# Patient Record
Sex: FEMALE | Race: WHITE | Employment: FULL TIME | URBAN - METROPOLITAN AREA
[De-identification: names, ages, dates, MRNs, and addresses within clinical notes are randomized per-mention and may not be internally consistent; named-entity substitution may affect disease eponyms.]

---

## 2020-03-13 ENCOUNTER — OFFICE VISIT (OUTPATIENT)
Dept: FAMILY MEDICINE CLINIC | Facility: CLINIC | Age: 45
End: 2020-03-13
Payer: COMMERCIAL

## 2020-03-13 ENCOUNTER — LAB ENCOUNTER (OUTPATIENT)
Dept: LAB | Facility: REFERENCE LAB | Age: 45
End: 2020-03-13
Attending: FAMILY MEDICINE
Payer: COMMERCIAL

## 2020-03-13 VITALS
WEIGHT: 160 LBS | OXYGEN SATURATION: 98 % | DIASTOLIC BLOOD PRESSURE: 76 MMHG | HEIGHT: 67 IN | TEMPERATURE: 97 F | HEART RATE: 74 BPM | SYSTOLIC BLOOD PRESSURE: 108 MMHG | BODY MASS INDEX: 25.11 KG/M2

## 2020-03-13 DIAGNOSIS — Z23 NEED FOR VACCINATION: ICD-10-CM

## 2020-03-13 DIAGNOSIS — Z00.00 ENCOUNTER FOR ROUTINE ADULT HEALTH EXAMINATION WITHOUT ABNORMAL FINDINGS: Primary | ICD-10-CM

## 2020-03-13 DIAGNOSIS — Z00.00 ENCOUNTER FOR ROUTINE ADULT HEALTH EXAMINATION WITHOUT ABNORMAL FINDINGS: ICD-10-CM

## 2020-03-13 DIAGNOSIS — R63.5 WEIGHT GAIN: ICD-10-CM

## 2020-03-13 DIAGNOSIS — R25.3 EYELID TWITCH: ICD-10-CM

## 2020-03-13 DIAGNOSIS — F41.9 ANXIETY: ICD-10-CM

## 2020-03-13 DIAGNOSIS — Z12.31 SCREENING MAMMOGRAM, ENCOUNTER FOR: ICD-10-CM

## 2020-03-13 LAB
ALBUMIN SERPL-MCNC: 4.4 G/DL (ref 3.4–5)
ALBUMIN/GLOB SERPL: 1.2 {RATIO} (ref 1–2)
ALP LIVER SERPL-CCNC: 59 U/L (ref 39–100)
ALT SERPL-CCNC: 20 U/L (ref 13–56)
ANION GAP SERPL CALC-SCNC: 7 MMOL/L (ref 0–18)
AST SERPL-CCNC: 14 U/L (ref 15–37)
BASOPHILS # BLD AUTO: 0.05 X10(3) UL (ref 0–0.2)
BASOPHILS NFR BLD AUTO: 0.5 %
BILIRUB SERPL-MCNC: 0.8 MG/DL (ref 0.1–2)
BUN BLD-MCNC: 16 MG/DL (ref 7–18)
BUN/CREAT SERPL: 22.2 (ref 10–20)
CALCIUM BLD-MCNC: 9.1 MG/DL (ref 8.5–10.1)
CHLORIDE SERPL-SCNC: 106 MMOL/L (ref 98–112)
CHOLEST SMN-MCNC: 195 MG/DL (ref ?–200)
CO2 SERPL-SCNC: 27 MMOL/L (ref 21–32)
CREAT BLD-MCNC: 0.72 MG/DL (ref 0.55–1.02)
DEPRECATED RDW RBC AUTO: 43.2 FL (ref 35.1–46.3)
EOSINOPHIL # BLD AUTO: 0.1 X10(3) UL (ref 0–0.7)
EOSINOPHIL NFR BLD AUTO: 1.1 %
ERYTHROCYTE [DISTWIDTH] IN BLOOD BY AUTOMATED COUNT: 12 % (ref 11–15)
EST. AVERAGE GLUCOSE BLD GHB EST-MCNC: 103 MG/DL (ref 68–126)
GLOBULIN PLAS-MCNC: 3.7 G/DL (ref 2.8–4.4)
GLUCOSE BLD-MCNC: 79 MG/DL (ref 70–99)
HBA1C MFR BLD HPLC: 5.2 % (ref ?–5.7)
HCT VFR BLD AUTO: 40.6 % (ref 35–48)
HCV AB SERPL QL IA: NONREACTIVE
HDLC SERPL-MCNC: 91 MG/DL (ref 40–59)
HGB BLD-MCNC: 13.7 G/DL (ref 12–16)
IMM GRANULOCYTES # BLD AUTO: 0.01 X10(3) UL (ref 0–1)
IMM GRANULOCYTES NFR BLD: 0.1 %
LDLC SERPL CALC-MCNC: 93 MG/DL (ref ?–100)
LYMPHOCYTES # BLD AUTO: 2.18 X10(3) UL (ref 1–4)
LYMPHOCYTES NFR BLD AUTO: 23.3 %
M PROTEIN MFR SERPL ELPH: 8.1 G/DL (ref 6.4–8.2)
MCH RBC QN AUTO: 33.4 PG (ref 26–34)
MCHC RBC AUTO-ENTMCNC: 33.7 G/DL (ref 31–37)
MCV RBC AUTO: 99 FL (ref 80–100)
MONOCYTES # BLD AUTO: 0.53 X10(3) UL (ref 0.1–1)
MONOCYTES NFR BLD AUTO: 5.7 %
NEUTROPHILS # BLD AUTO: 6.48 X10 (3) UL (ref 1.5–7.7)
NEUTROPHILS # BLD AUTO: 6.48 X10(3) UL (ref 1.5–7.7)
NEUTROPHILS NFR BLD AUTO: 69.3 %
NONHDLC SERPL-MCNC: 104 MG/DL (ref ?–130)
OSMOLALITY SERPL CALC.SUM OF ELEC: 290 MOSM/KG (ref 275–295)
PATIENT FASTING Y/N/NP: YES
PATIENT FASTING Y/N/NP: YES
PLATELET # BLD AUTO: 255 10(3)UL (ref 150–450)
POTASSIUM SERPL-SCNC: 3.5 MMOL/L (ref 3.5–5.1)
RBC # BLD AUTO: 4.1 X10(6)UL (ref 3.8–5.3)
SODIUM SERPL-SCNC: 140 MMOL/L (ref 136–145)
TRIGL SERPL-MCNC: 54 MG/DL (ref 30–149)
TSI SER-ACNC: 1.83 MIU/ML (ref 0.36–3.74)
VLDLC SERPL CALC-MCNC: 11 MG/DL (ref 0–30)
WBC # BLD AUTO: 9.4 X10(3) UL (ref 4–11)

## 2020-03-13 PROCEDURE — 80061 LIPID PANEL: CPT

## 2020-03-13 PROCEDURE — 99202 OFFICE O/P NEW SF 15 MIN: CPT | Performed by: FAMILY MEDICINE

## 2020-03-13 PROCEDURE — 85025 COMPLETE CBC W/AUTO DIFF WBC: CPT

## 2020-03-13 PROCEDURE — 36415 COLL VENOUS BLD VENIPUNCTURE: CPT

## 2020-03-13 PROCEDURE — 86803 HEPATITIS C AB TEST: CPT

## 2020-03-13 PROCEDURE — 90471 IMMUNIZATION ADMIN: CPT | Performed by: FAMILY MEDICINE

## 2020-03-13 PROCEDURE — 80053 COMPREHEN METABOLIC PANEL: CPT

## 2020-03-13 PROCEDURE — 84443 ASSAY THYROID STIM HORMONE: CPT

## 2020-03-13 PROCEDURE — 99386 PREV VISIT NEW AGE 40-64: CPT | Performed by: FAMILY MEDICINE

## 2020-03-13 PROCEDURE — 90686 IIV4 VACC NO PRSV 0.5 ML IM: CPT | Performed by: FAMILY MEDICINE

## 2020-03-13 PROCEDURE — 83036 HEMOGLOBIN GLYCOSYLATED A1C: CPT

## 2020-03-13 RX ORDER — VENLAFAXINE HYDROCHLORIDE 150 MG/1
CAPSULE, EXTENDED RELEASE ORAL
Qty: 60 CAPSULE | Refills: 0 | Status: SHIPPED | OUTPATIENT
Start: 2020-03-13 | End: 2020-04-13

## 2020-03-13 RX ORDER — VENLAFAXINE HYDROCHLORIDE 75 MG/1
CAPSULE, EXTENDED RELEASE ORAL
Qty: 10 CAPSULE | Refills: 0 | Status: SHIPPED | OUTPATIENT
Start: 2020-03-13 | End: 2020-04-13

## 2020-03-13 NOTE — PROGRESS NOTES
HPI:   Aure Singh is a 53year old female who presents for a complete physical exam.     Gynecologist is Dr. Tung Amanda.  Having her left earlobe repaired and will see Dr. Tiki Austin for this, but will postpone this until things settle down a little bit wit mild anxiety          Current Outpatient Medications   Medication Sig Dispense Refill   • Levonorgestrel (MIRENA, 52 MG,) 20 MCG/24HR Intrauterine IUD 1 each by Intrauterine route once.  Placed about 4 years     • Venlafaxine HCl ER 75 MG Oral Capsule SR 24 lower extremities, normal cerebellar testing, no focal neurologic deficits   Psych: normal mood and affect     No results found for: CHOLEST, HDL, LDL, TRIGLY   ASSESSMENT AND PLAN:   Aure Singh is a 53year old female who presents for a complete phys vaccine  -Need for Tdap once as an adult and Td booster every 10 years  -Need for Zoster vaccine for patients >= 50  -Contraception: Mirena IUD  -STI screening (GC/Chlamydia/HIV): Not indicated  -Hepatitis C screening for all adults between the ages of 25

## 2020-03-13 NOTE — PATIENT INSTRUCTIONS
Prevention Guidelines, Women Ages 36 to 52  Screening tests and vaccines are an important part of managing your health. A screening test is done to find diseases in people who don't have any symptoms.  The goal is to find a disease early so lifestyle hillman · Flexible sigmoidoscopy every 5 years, or  · Colonoscopy every 10 years, or  · CT colonography (virtual colonoscopy) every 5 years, or  · Yearly fecal occult blood test, or  · Yearly fecal immunochemical test every year, or  · Stool DNA test, every 3 year Chickenpox (varicella) All women in this age group who have no record of this infection or vaccine 2 doses; the second dose should be given at least 4 weeks after the first dose   Hepatitis A Women at increased risk for infection–talk with your healthcare Use of tobacco and the health effects it can cause All women in this age group Every exam   1 American Diabetes Association  2 American College of Obstetricians and Gynecologists   3 [address]  [address] [facility]  Date Last R · Anti-anxiety medicine. This medicine eases symptoms and helps you relax. Your healthcare provider will explain when and how to use it. It may be prescribed for use before situations that make you anxious.  You may also be told to take medicine on a regula · Side effects. Medicines may cause side effects. Ask your healthcare provider or pharmacist what you can expect. They may have ideas for avoiding some side effects. · Sexual problems.  Some antidepressants can affect your desire for sex or your ability to

## 2020-04-13 RX ORDER — VENLAFAXINE HYDROCHLORIDE 75 MG/1
75 CAPSULE, EXTENDED RELEASE ORAL DAILY
Qty: 14 CAPSULE | Refills: 0 | Status: SHIPPED | OUTPATIENT
Start: 2020-04-13 | End: 2020-04-15

## 2020-04-15 NOTE — PROGRESS NOTES
CC:  Patient presents with:  Medication Follow-Up: Adverse side effects from Venlafaxine       HPI: 53year old female presenting for video visit for adverse side effects from Venlafaxine.    Renville fine initially on Venlafaxine 75 mg daily and felt the eye t Lifestyle      Physical activity:        Days per week: Not on file        Minutes per session: Not on file      Stress: Not on file    Relationships      Social connections:        Talks on phone: Not on file        Gets together: Not on file        Atten and notify me if side effects do not resolve     Discussed that nodule at the base of her finger is likely due to a benign ganglion cyst but to notify me if it enlarges or causes any pain or limitations of motion.      Please note that the following visit w

## 2020-05-11 RX ORDER — VENLAFAXINE HYDROCHLORIDE 150 MG/1
CAPSULE, EXTENDED RELEASE ORAL
Qty: 30 CAPSULE | Refills: 1 | OUTPATIENT
Start: 2020-05-11

## 2020-05-11 NOTE — TELEPHONE ENCOUNTER
Please ensure patient is still taking 75 mg daily instead of 150 mg as discussed at her phone visit and doing well at 75 mg dose. Should have enough.

## 2020-05-11 NOTE — TELEPHONE ENCOUNTER
A refill request was received for:  Requested Prescriptions     Pending Prescriptions Disp Refills   • Venlafaxine HCl  MG Oral Capsule SR 24 Hr [Pharmacy Med Name: VENLAFAXINE HCL  MG CAP] 30 capsule 1     Sig: TAKE 1 CAPSULE BY MOUTH EVERY DA

## 2020-05-19 RX ORDER — VENLAFAXINE HYDROCHLORIDE 75 MG/1
75 CAPSULE, EXTENDED RELEASE ORAL DAILY
Qty: 90 CAPSULE | Refills: 1 | Status: SHIPPED | OUTPATIENT
Start: 2020-05-19 | End: 2020-10-05 | Stop reason: DRUGHIGH

## 2020-05-19 NOTE — TELEPHONE ENCOUNTER
Patient did clarify she is taking 75mg and is doing ok, but did only received a 30 day supply so does need a refill at the [facility] listed

## 2020-10-05 ENCOUNTER — TELEPHONE (OUTPATIENT)
Dept: FAMILY MEDICINE CLINIC | Facility: CLINIC | Age: 45
End: 2020-10-05

## 2020-10-05 RX ORDER — VENLAFAXINE HYDROCHLORIDE 37.5 MG/1
37.5 CAPSULE, EXTENDED RELEASE ORAL DAILY
Qty: 90 CAPSULE | Refills: 0 | Status: SHIPPED | OUTPATIENT
Start: 2020-10-05 | End: 2021-01-21

## 2020-10-05 NOTE — TELEPHONE ENCOUNTER
Reports she gained weight on Venlafaxine and felt dizzy while taking 150 mg, so she weaned down to 75 mg daily.  Wants to get off of it fully so she started taking it every other day but a week into this she felt extremely dizzy and had nausea with pain kathe

## 2020-10-20 ENCOUNTER — HOSPITAL ENCOUNTER (OUTPATIENT)
Dept: MAMMOGRAPHY | Age: 45
Discharge: HOME | End: 2020-10-20
Attending: FAMILY MEDICINE
Payer: COMMERCIAL

## 2020-10-20 DIAGNOSIS — Z12.31 SCREENING MAMMOGRAM, ENCOUNTER FOR: ICD-10-CM

## 2020-10-20 PROCEDURE — 77067 SCR MAMMO BI INCL CAD: CPT | Performed by: FAMILY MEDICINE

## 2020-10-20 PROCEDURE — 77063 BREAST TOMOSYNTHESIS BI: CPT | Performed by: FAMILY MEDICINE

## 2020-11-06 RX ORDER — VENLAFAXINE HYDROCHLORIDE 75 MG/1
CAPSULE, EXTENDED RELEASE ORAL
Qty: 30 CAPSULE | Refills: 5 | OUTPATIENT
Start: 2020-11-06

## 2020-11-10 ENCOUNTER — TELEPHONE (OUTPATIENT)
Dept: FAMILY MEDICINE CLINIC | Facility: CLINIC | Age: 45
End: 2020-11-10

## 2020-11-10 NOTE — TELEPHONE ENCOUNTER
Called pharmacy and got one month supply d/t insurance and pt has 2 refills at the pharmacy. Called pt and informed that have refills at pharmacy and explained that insurance will only give a month supply.   Per pt has tried to pick-up medication but pha

## 2020-11-10 NOTE — TELEPHONE ENCOUNTER
Patient is needing refill       Venlafaxine HCl ER 37.5 MG Oral Capsule SR 24 Hr      [id number] [place], [place] - [address]  AT [place], [phone], [phone]

## 2021-01-04 RX ORDER — VENLAFAXINE HYDROCHLORIDE 37.5 MG/1
CAPSULE, EXTENDED RELEASE ORAL
Qty: 30 CAPSULE | Refills: 1 | OUTPATIENT
Start: 2021-01-04

## 2021-01-21 RX ORDER — VENLAFAXINE HYDROCHLORIDE 37.5 MG/1
37.5 CAPSULE, EXTENDED RELEASE ORAL DAILY
Qty: 30 CAPSULE | Refills: 0 | Status: SHIPPED | OUTPATIENT
Start: 2021-01-21 | End: 2021-02-15

## 2021-02-15 RX ORDER — VENLAFAXINE HYDROCHLORIDE 37.5 MG/1
CAPSULE, EXTENDED RELEASE ORAL
Qty: 30 CAPSULE | Refills: 0 | Status: SHIPPED | OUTPATIENT
Start: 2021-02-15 | End: 2021-02-18 | Stop reason: ALTCHOICE

## 2021-02-15 NOTE — TELEPHONE ENCOUNTER
A refill request was received for:  Requested Prescriptions     Pending Prescriptions Disp Refills   • VENLAFAXINE HCL ER 37.5 MG Oral Capsule SR 24 Hr [Pharmacy Med Name: VENLAFAXINE HCL ER 37.5 MG CAP] 30 capsule 0     Sig: TAKE 1 CAPSULE BY MOUTH EVERY

## 2021-02-18 ENCOUNTER — LABORATORY ENCOUNTER (OUTPATIENT)
Dept: LAB | Facility: REFERENCE LAB | Age: 46
End: 2021-02-18
Attending: FAMILY MEDICINE
Payer: COMMERCIAL

## 2021-02-18 DIAGNOSIS — M25.50 ARTHRALGIA, UNSPECIFIED JOINT: ICD-10-CM

## 2021-02-18 LAB — RHEUMATOID FACT SERPL-ACNC: <10 IU/ML (ref ?–15)

## 2021-02-18 PROCEDURE — 36415 COLL VENOUS BLD VENIPUNCTURE: CPT

## 2021-02-18 PROCEDURE — 86200 CCP ANTIBODY: CPT

## 2021-02-18 PROCEDURE — 86431 RHEUMATOID FACTOR QUANT: CPT

## 2021-02-18 NOTE — PATIENT INSTRUCTIONS
-Taper off Venlafaxine prior to starting Desvenlafaxine by taking Venlafaxine 37.5 mg every other day for one week, then do not take anything for one day, then start new Desvenlafaxine at 25 mg daily  -Notify me how you are doing after a few weeks or soone and bilirubin. Both of these may be high if the gallstones are blocking bile flow or irritating the liver. Treating gallstones  If your stones are not causing symptoms, you may choose to delay treatment.  But if you’ve had 1 or more painful attacks, your

## 2021-02-18 NOTE — PROGRESS NOTES
CC:  Patient presents with: Follow - Up: patient has a colonoscopy and has gallstones  Other: talk about her medication      HPI: 50year old female here to follow-up on medication for anxiety and following up after colonoscopy.   Had a positive Cologuard Non-medical: Not on file    Tobacco Use      Smoking status: Never Smoker      Smokeless tobacco: Never Used    Substance and Sexual Activity      Alcohol use: Yes        Comment: socailly      Drug use: Never      Sexual activity: Not on file    Lifestyle masses, no guarding, no rebound  Dermatologic:  No rashes or lesions  Psych: normal mood and affect    Assessment and Plan: 50year old female here to follow-up on anxiety management, gallstones, constipation, and joint pain.      1. Anxiety    - Did feel V

## 2021-02-19 LAB — CCP IGG SERPL-ACNC: 0.7 U/ML (ref 0–6.9)

## 2021-03-08 ENCOUNTER — MED REC SCAN ONLY (OUTPATIENT)
Dept: FAMILY MEDICINE CLINIC | Facility: CLINIC | Age: 46
End: 2021-03-08

## 2021-03-11 ENCOUNTER — PATIENT MESSAGE (OUTPATIENT)
Dept: FAMILY MEDICINE CLINIC | Facility: CLINIC | Age: 46
End: 2021-03-11

## 2021-03-12 NOTE — TELEPHONE ENCOUNTER
----- Message from Alexandro Roman DO sent at 3/12/2021  9:55 AM CST -----  Regarding: FW: Non-Urgent Medical Question  Contact: [phone]  Mind sending her the information, calling her, or having her come back up the results?  Thanks!  ----- Message -----

## 2021-03-24 ENCOUNTER — HOSPITAL ENCOUNTER (OUTPATIENT)
Dept: GENERAL RADIOLOGY | Age: 46
Discharge: HOME | End: 2021-03-24
Attending: PHYSICAL MEDICINE & REHABILITATION
Payer: COMMERCIAL

## 2021-03-24 ENCOUNTER — TELEPHONE (OUTPATIENT)
Dept: NEUROLOGY | Facility: CLINIC | Age: 46
End: 2021-03-24

## 2021-03-24 ENCOUNTER — OFFICE VISIT (OUTPATIENT)
Dept: NEUROLOGY | Facility: CLINIC | Age: 46
End: 2021-03-24
Payer: COMMERCIAL

## 2021-03-24 VITALS
OXYGEN SATURATION: 97 % | RESPIRATION RATE: 20 BRPM | SYSTOLIC BLOOD PRESSURE: 110 MMHG | WEIGHT: 142 LBS | HEART RATE: 75 BPM | BODY MASS INDEX: 22.29 KG/M2 | HEIGHT: 67 IN | DIASTOLIC BLOOD PRESSURE: 70 MMHG

## 2021-03-24 DIAGNOSIS — M79.644 CHRONIC PAIN OF RIGHT THUMB: ICD-10-CM

## 2021-03-24 DIAGNOSIS — M77.11 RIGHT LATERAL EPICONDYLITIS: ICD-10-CM

## 2021-03-24 DIAGNOSIS — M54.12 CERVICAL RADICULOPATHY: ICD-10-CM

## 2021-03-24 DIAGNOSIS — G89.29 CHRONIC PAIN OF RIGHT THUMB: ICD-10-CM

## 2021-03-24 DIAGNOSIS — M54.12 CERVICAL RADICULOPATHY: Primary | ICD-10-CM

## 2021-03-24 PROCEDURE — 99244 OFF/OP CNSLTJ NEW/EST MOD 40: CPT | Performed by: PHYSICAL MEDICINE & REHABILITATION

## 2021-03-24 PROCEDURE — 72050 X-RAY EXAM NECK SPINE 4/5VWS: CPT | Performed by: PHYSICAL MEDICINE & REHABILITATION

## 2021-03-24 PROCEDURE — 73130 X-RAY EXAM OF HAND: CPT | Performed by: PHYSICAL MEDICINE & REHABILITATION

## 2021-03-24 PROCEDURE — 3008F BODY MASS INDEX DOCD: CPT | Performed by: PHYSICAL MEDICINE & REHABILITATION

## 2021-03-24 PROCEDURE — 3074F SYST BP LT 130 MM HG: CPT | Performed by: PHYSICAL MEDICINE & REHABILITATION

## 2021-03-24 PROCEDURE — 3078F DIAST BP <80 MM HG: CPT | Performed by: PHYSICAL MEDICINE & REHABILITATION

## 2021-03-24 NOTE — PATIENT INSTRUCTIONS
Plan  She will get right thumb x-rays. She will get cervical spine x-rays. She will start PT on the cervical spine and the right arm. She will look into using short thumb spica splint.     I spoke to her about the different types of injections fo

## 2021-03-24 NOTE — PROGRESS NOTES
Cervical Pain H & P    Chief Complaint:  Patient presents with:  Hand Pain: New right handed patient. Rfd. by Dr. Humberto Rivas. Patientn here for right elbow and right thumb pain that started about a year ago from tennis and fall.  Patient states she had steroid sh sharp pain. The thumb pain ranges from 0-7/10 and it is a sharp pain that is brief only when she hits the right thumb. She is not able to grab or twist off caps due to the right thumb issues. She denies any neck or shoulder pain.       Past Medical Histo of Food in the Last Year:       Ran Out of Food in the Last Year:   Transportation Needs:       Lack of Transportation (Medical):       Lack of Transportation (Non-Medical):   Physical Activity:       Days of Exercise per Week:       Minutes of Exercise pe rashes or lesions. Lymph Nodes: The patient has no palpable submandibular, supraclavicular, and cervical lymph nodes. .    Vitals:   03/24/21  0908   BP: 110/70   Pulse: 75   Resp: 20       Cervical Spine:    Posture: moderate chin forward superiorly ro of injections for the lateral epicondylitis if the PT does not help. She will follow up in 2 month or sooner if needed. The patient understands and agrees with the stated plan. Mae Lozano MD  3/24/2021

## 2021-04-05 ENCOUNTER — HOSPITAL ENCOUNTER (OUTPATIENT)
Age: 46
Discharge: HOME | End: 2021-04-05
Payer: COMMERCIAL

## 2021-04-05 VITALS
RESPIRATION RATE: 18 BRPM | TEMPERATURE: 98 F | OXYGEN SATURATION: 100 % | SYSTOLIC BLOOD PRESSURE: 121 MMHG | DIASTOLIC BLOOD PRESSURE: 80 MMHG | HEART RATE: 64 BPM

## 2021-04-05 DIAGNOSIS — Z20.822 ENCOUNTER FOR LABORATORY TESTING FOR COVID-19 VIRUS: Primary | ICD-10-CM

## 2021-04-05 PROCEDURE — 99202 OFFICE O/P NEW SF 15 MIN: CPT | Performed by: NURSE PRACTITIONER

## 2021-04-05 PROCEDURE — U0002 COVID-19 LAB TEST NON-CDC: HCPCS | Performed by: NURSE PRACTITIONER

## 2021-04-05 NOTE — ED PROVIDER NOTES
Patient Seen in: [facility]    History   CC: covid testing  HPI: Ricci Henriquez 50year old female  who presents requesting Covid testing after returning home from [place] 2 days ago. Patient denies any symptoms.     Past Medical History:   D bones  Eyes - sclera not injected, no discharge noted, no periorbital edema  ENT - EAC bilaterally without discharge, TM pearly grey with COL visualized appropriately bilaterally. no nasal drainage noted in nares bilat, no cobblestoning to post. Pharynx.

## 2021-04-13 ENCOUNTER — LAB REQUISITION (OUTPATIENT)
Dept: LAB | Facility: HOSPITAL | Age: 46
End: 2021-04-13
Payer: COMMERCIAL

## 2021-04-13 DIAGNOSIS — Z01.818 ENCOUNTER FOR OTHER PREPROCEDURAL EXAMINATION: ICD-10-CM

## 2021-04-16 ENCOUNTER — SURGERY CENTER DOCUMENTATION (OUTPATIENT)
Dept: SURGERY | Age: 46
End: 2021-04-16

## 2021-04-16 NOTE — PROCEDURES
[facility] OPERATIVE REPORT:     PATIENT NAME: Nancy Talbot  : 11/15/1975   MRN: [id number]    DATE OF OPERATION:   21    PREOPERATIVE DIAGNOSIS: Chronic Cholecystitis, Cholelithiasis      POSTOPERATIVE DIAGNOSIS: Chron times below the ductotomy and transected. The cystic artery was clipped and transected and the gallbladder was removed from the liver bed using blunt dissection and electrocautery.   The gallbladder was placed in specimen retrieval bag and was removed thro

## 2021-05-13 RX ORDER — DESVENLAFAXINE 25 MG/1
TABLET, EXTENDED RELEASE ORAL
Qty: 30 TABLET | Refills: 2 | Status: SHIPPED | OUTPATIENT
Start: 2021-05-13 | End: 2021-08-28

## 2021-05-13 NOTE — TELEPHONE ENCOUNTER
A refill request was received for:  Requested Prescriptions     Pending Prescriptions Disp Refills   • DESVENLAFAXINE SUCCINATE ER 25 MG Oral Tablet 24 Hr [Pharmacy Med Name: DESVENLAFAXINE SUCCNT ER 25 MG] 30 tablet 2     Sig: TAKE 1 TABLET BY MOUTH DAILY

## 2021-05-25 ENCOUNTER — TELEPHONE (OUTPATIENT)
Dept: NEUROLOGY | Facility: CLINIC | Age: 46
End: 2021-05-25

## 2021-05-25 NOTE — TELEPHONE ENCOUNTER
----- Message from Glory Graham MD sent at 5/21/2021 10:33 PM CDT -----  Normal x-rays. She had surgery on her gall bladder.   Please see how she is doing now since it has been over 2 months since I last saw her and if she was able to do any of the PT.

## 2021-06-26 ENCOUNTER — PATIENT MESSAGE (OUTPATIENT)
Dept: FAMILY MEDICINE CLINIC | Facility: CLINIC | Age: 46
End: 2021-06-26

## 2021-06-28 NOTE — TELEPHONE ENCOUNTER
From: Elis Birch  To: Brennon Turcios DO  Sent: 6/26/2021 10:25 AM CDT  Subject: Prescription Question    Dr. Jennifer Fan,    I would like to change my medication. Can I get on a quick call with you?

## 2021-06-28 NOTE — TELEPHONE ENCOUNTER
DO Inocencia Pereira 10 Dr. Jasmin Ash 1 hour ago (11:36 AM)     Please schedule video visit during any pumping slot. Called pt and scheduled for video visit on 06/30/21 at 1030 am.  Pt verbalized understanding and agrees with POC.

## 2021-06-30 NOTE — PROGRESS NOTES
CC:  Patient presents with:  Medication Follow-Up      HPI: 50year old female presenting for video visit to follow-up on medication for anxiety.   Switched to Pristiq from Venlafaxine in February due to sexual side effects with Venlafaxine, but she is stil Concern: Not Asked         Service: Not Asked        Blood Transfusions: Not Asked        Occupational Exposure: Not Asked        Hobby Hazards: Not Asked        Sleep Concern: Not Asked        Back Care: Not Asked        Bike Helmet: Not Asked problems made it for you to  do your work, take care of things at home, or get along with other people? Not difficult at all     From the Primary Care Evaluation of Mental Disorders Patient Health Questionnaire (PRIME-MD PHQ).  The PHQ was developed by [employer] present    - Related to recent cholecystectomy  - Given handout on dietary changes needed and also to increase Famotidine to 20 mg twice daily x 1-2 months     3.  Night sweats    - TSH normal when recently checked  - Likely perimenopausal and recommend Mac

## 2021-07-07 ENCOUNTER — MED REC SCAN ONLY (OUTPATIENT)
Dept: NEUROLOGY | Facility: CLINIC | Age: 46
End: 2021-07-07

## 2021-08-09 ENCOUNTER — HOSPITAL ENCOUNTER (OUTPATIENT)
Age: 46
Discharge: HOME | End: 2021-08-09
Payer: COMMERCIAL

## 2021-08-09 VITALS
DIASTOLIC BLOOD PRESSURE: 88 MMHG | TEMPERATURE: 97 F | HEART RATE: 70 BPM | RESPIRATION RATE: 18 BRPM | SYSTOLIC BLOOD PRESSURE: 131 MMHG | OXYGEN SATURATION: 98 %

## 2021-08-09 DIAGNOSIS — J02.9 VIRAL PHARYNGITIS: Primary | ICD-10-CM

## 2021-08-09 LAB — S PYO AG THROAT QL: NEGATIVE

## 2021-08-09 PROCEDURE — 87880 STREP A ASSAY W/OPTIC: CPT | Performed by: NURSE PRACTITIONER

## 2021-08-09 PROCEDURE — 99213 OFFICE O/P EST LOW 20 MIN: CPT | Performed by: NURSE PRACTITIONER

## 2021-08-09 NOTE — ED INITIAL ASSESSMENT (HPI)
Pt here with c/o sore throat after positive exposure to strep in family. Sore throat started slightly on Thursday and then worsened last night. Denies any other complaints.

## 2021-08-09 NOTE — ED PROVIDER NOTES
Patient Seen in: [facility]      History   Patient presents with:  Sore Throat: Entered by patient    Stated Complaint: Sore Throat    HPI/Subjective:   Well-appearing 53-year-old female presents with complaints of a sore throat.   Patient co right tympanic membranes normal.      Pharynx: Posterior oropharyngeal erythema present. Neck: No cervical lymphadenopathy. Supple. Normal ROM. Lungs:  Good inspiratory effort. No accessory muscle use or tachypnea. Abdomen: Non-distended.     Extr

## 2021-08-25 ENCOUNTER — OFFICE VISIT (OUTPATIENT)
Dept: NEUROLOGY | Facility: CLINIC | Age: 46
End: 2021-08-25
Payer: COMMERCIAL

## 2021-08-25 VITALS
HEART RATE: 66 BPM | SYSTOLIC BLOOD PRESSURE: 126 MMHG | HEIGHT: 67 IN | WEIGHT: 150 LBS | DIASTOLIC BLOOD PRESSURE: 80 MMHG | BODY MASS INDEX: 23.54 KG/M2 | OXYGEN SATURATION: 97 %

## 2021-08-25 DIAGNOSIS — M54.12 CERVICAL RADICULOPATHY: ICD-10-CM

## 2021-08-25 DIAGNOSIS — G89.29 CHRONIC PAIN OF RIGHT THUMB: Primary | ICD-10-CM

## 2021-08-25 DIAGNOSIS — M79.644 CHRONIC PAIN OF RIGHT THUMB: Primary | ICD-10-CM

## 2021-08-25 DIAGNOSIS — M77.11 RIGHT LATERAL EPICONDYLITIS: ICD-10-CM

## 2021-08-25 PROCEDURE — 3008F BODY MASS INDEX DOCD: CPT | Performed by: PHYSICAL MEDICINE & REHABILITATION

## 2021-08-25 PROCEDURE — 3079F DIAST BP 80-89 MM HG: CPT | Performed by: PHYSICAL MEDICINE & REHABILITATION

## 2021-08-25 PROCEDURE — 3074F SYST BP LT 130 MM HG: CPT | Performed by: PHYSICAL MEDICINE & REHABILITATION

## 2021-08-25 PROCEDURE — 99214 OFFICE O/P EST MOD 30 MIN: CPT | Performed by: PHYSICAL MEDICINE & REHABILITATION

## 2021-08-25 NOTE — PROGRESS NOTES
Cervical Pain H & P    Chief Complaint:  Patient presents with:  PT Follow-Up: LOV 03/24/21. Patient fonished PT. States 60% improvement. She is doing HEP for right thumb but not neck. She did not use a short thumb splint. States she is doing a lot better. Topics      Concerns:        Caffeine Concern: Yes          1 cup coffee daily        Exercise:  Yes          running 3x weekly        Seat Belt: Not Asked        Special Diet: Not Asked        Stress Concern: Not Asked        Weight Concern: Not Asked light. No redness or discharge bilaterally. Skin:  There are no rashes or lesions. Vitals:   08/25/21  1729   BP: 126/80   Pulse: 66       Cervical Spine:    Posture: normal chin forward superiorly rotated protracted shoulder posture.    Shoulders: Sonia Neas

## 2021-08-28 RX ORDER — DESVENLAFAXINE 25 MG/1
25 TABLET, EXTENDED RELEASE ORAL DAILY
Qty: 90 TABLET | Refills: 0 | Status: SHIPPED | OUTPATIENT
Start: 2021-08-28

## 2021-08-28 NOTE — TELEPHONE ENCOUNTER
A refill request was received for:  Requested Prescriptions     Pending Prescriptions Disp Refills   • DESVENLAFAXINE ER 25 MG Oral Tablet 24 Hr [Pharmacy Med Name: DESVENLAFAXINE SUCCNT ER 25 MG] 30 tablet 2     Sig: TAKE 1 TABLET BY MOUTH DAILY.  START AF

## 2021-11-04 ENCOUNTER — HOSPITAL ENCOUNTER (OUTPATIENT)
Age: 46
Discharge: HOME | End: 2021-11-04
Payer: COMMERCIAL

## 2021-11-04 VITALS
HEART RATE: 74 BPM | TEMPERATURE: 98 F | OXYGEN SATURATION: 98 % | SYSTOLIC BLOOD PRESSURE: 121 MMHG | DIASTOLIC BLOOD PRESSURE: 77 MMHG | RESPIRATION RATE: 18 BRPM

## 2021-11-04 DIAGNOSIS — Z11.52 ENCOUNTER FOR SCREENING FOR COVID-19: ICD-10-CM

## 2021-11-04 DIAGNOSIS — Z20.822 LAB TEST NEGATIVE FOR COVID-19 VIRUS: ICD-10-CM

## 2021-11-04 DIAGNOSIS — J02.9 VIRAL PHARYNGITIS: Primary | ICD-10-CM

## 2021-11-04 PROCEDURE — 99213 OFFICE O/P EST LOW 20 MIN: CPT | Performed by: NURSE PRACTITIONER

## 2021-11-04 PROCEDURE — 87880 STREP A ASSAY W/OPTIC: CPT | Performed by: NURSE PRACTITIONER

## 2021-11-04 PROCEDURE — U0002 COVID-19 LAB TEST NON-CDC: HCPCS | Performed by: NURSE PRACTITIONER

## 2021-11-04 NOTE — ED INITIAL ASSESSMENT (HPI)
Pt states waking up with a sore throat today, states her entire household has a sore throat. Pt denies fever or NVD.

## 2021-11-04 NOTE — ED PROVIDER NOTES
Patient Seen in: [facility]      History   Patient presents with:  Sore Throat    Stated Complaint: strep    Subjective:   Well-appearing 64-year-old female presents with complaints of waking up with a sore throat today morning.   Patient com Lips: Pink. Mouth: Mucous membranes are moist.      Pharynx: Uvula midline. Posterior oropharyngeal erythema present. No pharyngeal swelling, oropharyngeal exudate or uvula swelling. Tonsils: No tonsillar exudate or tonsillar abscesses.  2+ on the

## 2021-11-10 ENCOUNTER — PATIENT MESSAGE (OUTPATIENT)
Dept: FAMILY MEDICINE CLINIC | Facility: CLINIC | Age: 46
End: 2021-11-10

## 2021-11-10 DIAGNOSIS — Z12.31 VISIT FOR SCREENING MAMMOGRAM: Primary | ICD-10-CM

## 2021-11-10 NOTE — TELEPHONE ENCOUNTER
From: Sommer Demarco  To: Maryfrances Halsted, DO  Sent: 11/10/2021 11:38 AM CST  Subject: Mammogram    I received an e-mail that it's time for my annual mammogram, can you please send a referral or do I just schedule it?

## 2022-03-22 ENCOUNTER — OFFICE VISIT (OUTPATIENT)
Dept: FAMILY MEDICINE CLINIC | Facility: CLINIC | Age: 47
End: 2022-03-22
Payer: COMMERCIAL

## 2022-03-22 VITALS
WEIGHT: 167 LBS | BODY MASS INDEX: 26.21 KG/M2 | SYSTOLIC BLOOD PRESSURE: 122 MMHG | OXYGEN SATURATION: 97 % | HEART RATE: 90 BPM | HEIGHT: 67 IN | DIASTOLIC BLOOD PRESSURE: 74 MMHG

## 2022-03-22 DIAGNOSIS — R10.11 RIGHT UPPER QUADRANT ABDOMINAL PAIN: Primary | ICD-10-CM

## 2022-03-22 DIAGNOSIS — K59.09 OTHER CONSTIPATION: ICD-10-CM

## 2022-03-22 PROCEDURE — 3008F BODY MASS INDEX DOCD: CPT | Performed by: FAMILY MEDICINE

## 2022-03-22 PROCEDURE — 3078F DIAST BP <80 MM HG: CPT | Performed by: FAMILY MEDICINE

## 2022-03-22 PROCEDURE — 3074F SYST BP LT 130 MM HG: CPT | Performed by: FAMILY MEDICINE

## 2022-03-22 PROCEDURE — 99214 OFFICE O/P EST MOD 30 MIN: CPT | Performed by: FAMILY MEDICINE

## 2022-03-22 RX ORDER — BUPROPION HYDROCHLORIDE 150 MG/1
150 TABLET ORAL DAILY
COMMUNITY

## 2022-03-23 ENCOUNTER — MED REC SCAN ONLY (OUTPATIENT)
Dept: FAMILY MEDICINE CLINIC | Facility: CLINIC | Age: 47
End: 2022-03-23

## 2022-03-23 ENCOUNTER — HOSPITAL ENCOUNTER (OUTPATIENT)
Dept: MAMMOGRAPHY | Age: 47
Discharge: HOME | End: 2022-03-23
Attending: FAMILY MEDICINE
Payer: COMMERCIAL

## 2022-03-23 DIAGNOSIS — Z12.31 VISIT FOR SCREENING MAMMOGRAM: ICD-10-CM

## 2022-03-23 PROCEDURE — 77067 SCR MAMMO BI INCL CAD: CPT | Performed by: FAMILY MEDICINE

## 2022-03-23 PROCEDURE — 77063 BREAST TOMOSYNTHESIS BI: CPT | Performed by: FAMILY MEDICINE

## 2022-03-24 ENCOUNTER — TELEPHONE (OUTPATIENT)
Dept: GASTROENTEROLOGY | Facility: CLINIC | Age: 47
End: 2022-03-24

## 2022-03-24 NOTE — TELEPHONE ENCOUNTER
GI Staff:   Can you call patient to be seen sooner for RLQ pain; Dr. Juan Reese notified me.  I can see patient on 4/7 @ 10AM

## 2022-03-24 NOTE — TELEPHONE ENCOUNTER
Contacted patient and confirmed appt for 4/7 at [place] [address]. Patient familiar with [facility] office location. She would like to keep original appt as well in case of follow up.      Your appointments     Date & Time Appointment Department [facility])    Apr 07, 2022 10:00 AM CDT Consult with Lm Gonzalez MD [address], [facility]

## 2022-04-07 ENCOUNTER — OFFICE VISIT (OUTPATIENT)
Dept: GASTROENTEROLOGY | Facility: CLINIC | Age: 47
End: 2022-04-07
Payer: COMMERCIAL

## 2022-04-07 VITALS
DIASTOLIC BLOOD PRESSURE: 76 MMHG | WEIGHT: 166 LBS | BODY MASS INDEX: 26.06 KG/M2 | HEIGHT: 67 IN | HEART RATE: 78 BPM | SYSTOLIC BLOOD PRESSURE: 114 MMHG

## 2022-04-07 DIAGNOSIS — K59.09 CHRONIC CONSTIPATION: ICD-10-CM

## 2022-04-07 DIAGNOSIS — R10.9 ABDOMINAL PAIN, UNSPECIFIED ABDOMINAL LOCATION: Primary | ICD-10-CM

## 2022-04-07 DIAGNOSIS — R14.0 ABDOMINAL BLOATING: ICD-10-CM

## 2022-04-07 PROCEDURE — 3078F DIAST BP <80 MM HG: CPT | Performed by: INTERNAL MEDICINE

## 2022-04-07 PROCEDURE — 3074F SYST BP LT 130 MM HG: CPT | Performed by: INTERNAL MEDICINE

## 2022-04-07 PROCEDURE — 3008F BODY MASS INDEX DOCD: CPT | Performed by: INTERNAL MEDICINE

## 2022-04-07 PROCEDURE — 99204 OFFICE O/P NEW MOD 45 MIN: CPT | Performed by: INTERNAL MEDICINE

## 2022-04-07 NOTE — PATIENT INSTRUCTIONS
1. Washout with Golytely  2. Start Linzess  72cmg/day to help with constipation  3. If not improving let me know  4. To get help with weight loss, I recommend seeing Dr. Ti Dickinson at [facility]. Please call Phone: [phone] to set up appointment. 5. Blood tests for celiac disease     -----------------    WASHOUT INSTRUCTIONS:  1. Pick a day you will be at home, close to a toilet. 2. Have a some juice for breakfast.   3. Around 10AM start drinking the solution prescribed (for trilyte, drink 1 cup every 15 minutes) over the course of 3-5 hours. IF having nausea/bloating, take a break for 30 minutes, walk around and then resume drinking. If vomiting, take a break for 1 hour, if symptoms improve, and you feel well, can resume drinking. 4. Goal is to finish solution or until stools turn liquid yellow. 5. For lunch you should have a liquid diet (7up, water, gingerale, etc)  6. After prep, for dinner you can have a light dinner. 7. Resume regular meals the following day, along with laxative medications. 8. You should continue your regular medications during the washout day.

## 2022-04-08 ENCOUNTER — PATIENT MESSAGE (OUTPATIENT)
Dept: GASTROENTEROLOGY | Facility: CLINIC | Age: 47
End: 2022-04-08

## 2022-04-08 NOTE — TELEPHONE ENCOUNTER
From: Ary Chowdhury  To: Pamela Banegas MD  Sent: 4/8/2022 4:12 PM CDT  Subject: Dr. Faith Childers,    Can you please help me get an appointment with Dr. Riccardo Singh, it says there is nothing available until August 12th!!!! That's not going to help me very much with 4 months out. Whatever you can do would be greatly appreciated.

## 2022-04-08 NOTE — TELEPHONE ENCOUNTER
Dr. Lis Kaufman,    Please see message below, is there another provider patient can see possibly first available?

## 2022-04-11 ENCOUNTER — TELEPHONE (OUTPATIENT)
Dept: GASTROENTEROLOGY | Facility: CLINIC | Age: 47
End: 2022-04-11

## 2022-04-11 NOTE — TELEPHONE ENCOUNTER
I tried to apply Sonia Maddox savings card:  Idris Lucius [id number]  [employer]CN  Group- [id number]  [id number]    I was told by the pharmacy that the $298.86 is after the savings card because the savings card will only  so much if the insurance has a high copay.

## 2022-04-11 NOTE — TELEPHONE ENCOUNTER
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 She did the cleanse, it helped but her pain is not completely gone. Rates it a 4/10 today. She wants to know if you were able to speak to Dr. Joelle Guo about getting her in sooner. I let her know that sometimes insurances have preferred drugs in each class. She was going to call [employer] to check in on her deductible and if it would be less once she met that. Did you want to see if an alternate drug in the same class would be less expensive?     Thank you

## 2022-04-12 RX ORDER — BUPROPION HYDROCHLORIDE 150 MG/1
150 TABLET ORAL DAILY
Qty: 90 TABLET | Refills: 1 | Status: SHIPPED | OUTPATIENT
Start: 2022-04-12 | End: 2022-04-12

## 2022-04-12 RX ORDER — BUPROPION HYDROCHLORIDE 150 MG/1
150 TABLET ORAL DAILY
Qty: 90 TABLET | Refills: 1 | Status: SHIPPED | OUTPATIENT
Start: 2022-04-12

## 2022-04-12 RX ORDER — LINACLOTIDE 72 UG/1
72 CAPSULE, GELATIN COATED ORAL
Qty: 30 CAPSULE | Refills: 3 | Status: SHIPPED | OUTPATIENT
Start: 2022-04-12 | End: 2022-05-12

## 2022-04-12 NOTE — TELEPHONE ENCOUNTER
Dr. Ramses Quiroz,    Please see message below. Orders for Linzess pended below to be sent to [facility]. Please review and sign if appropriate, thank you!

## 2022-04-13 ENCOUNTER — LAB ENCOUNTER (OUTPATIENT)
Dept: LAB | Facility: HOSPITAL | Age: 47
End: 2022-04-13
Attending: INTERNAL MEDICINE
Payer: COMMERCIAL

## 2022-04-13 DIAGNOSIS — E66.3 OVERWEIGHT (BMI 25.0-29.9): ICD-10-CM

## 2022-04-13 DIAGNOSIS — F41.9 ANXIETY: ICD-10-CM

## 2022-04-13 DIAGNOSIS — R14.0 ABDOMINAL BLOATING: ICD-10-CM

## 2022-04-13 DIAGNOSIS — R10.9 ABDOMINAL PAIN, UNSPECIFIED ABDOMINAL LOCATION: ICD-10-CM

## 2022-04-13 DIAGNOSIS — R10.31 RIGHT LOWER QUADRANT ABDOMINAL PAIN: ICD-10-CM

## 2022-04-13 DIAGNOSIS — F43.9 STRESS: ICD-10-CM

## 2022-04-13 LAB
ALBUMIN SERPL-MCNC: 4.7 G/DL (ref 3.4–5)
ALBUMIN/GLOB SERPL: 1.3 {RATIO} (ref 1–2)
ALP LIVER SERPL-CCNC: 70 U/L
ALT SERPL-CCNC: 39 U/L
ANION GAP SERPL CALC-SCNC: 6 MMOL/L (ref 0–18)
AST SERPL-CCNC: 31 U/L (ref 15–37)
BILIRUB SERPL-MCNC: 0.5 MG/DL (ref 0.1–2)
BUN BLD-MCNC: 17 MG/DL (ref 7–18)
BUN/CREAT SERPL: 21.8 (ref 10–20)
CALCIUM BLD-MCNC: 9.7 MG/DL (ref 8.5–10.1)
CHLORIDE SERPL-SCNC: 105 MMOL/L (ref 98–112)
CO2 SERPL-SCNC: 28 MMOL/L (ref 21–32)
CREAT BLD-MCNC: 0.78 MG/DL
FASTING STATUS PATIENT QL REPORTED: NO
GLOBULIN PLAS-MCNC: 3.5 G/DL (ref 2.8–4.4)
GLUCOSE BLD-MCNC: 88 MG/DL (ref 70–99)
IGA SERPL-MCNC: 448 MG/DL (ref 70–312)
OSMOLALITY SERPL CALC.SUM OF ELEC: 289 MOSM/KG (ref 275–295)
POTASSIUM SERPL-SCNC: 4.8 MMOL/L (ref 3.5–5.1)
PROT SERPL-MCNC: 8.2 G/DL (ref 6.4–8.2)
SODIUM SERPL-SCNC: 139 MMOL/L (ref 136–145)

## 2022-04-13 PROCEDURE — 86364 TISS TRNSGLTMNASE EA IG CLAS: CPT

## 2022-04-13 PROCEDURE — 36415 COLL VENOUS BLD VENIPUNCTURE: CPT

## 2022-04-13 PROCEDURE — 80053 COMPREHEN METABOLIC PANEL: CPT

## 2022-04-13 PROCEDURE — 82397 CHEMILUMINESCENT ASSAY: CPT

## 2022-04-13 PROCEDURE — 82784 ASSAY IGA/IGD/IGG/IGM EACH: CPT

## 2022-04-15 LAB — TTG IGA SER-ACNC: 0.7 U/ML (ref ?–7)

## 2022-04-18 ENCOUNTER — MED REC SCAN ONLY (OUTPATIENT)
Dept: FAMILY MEDICINE CLINIC | Facility: CLINIC | Age: 47
End: 2022-04-18

## 2022-04-18 LAB — LEPTIN: 16.6 NG/ML

## 2022-04-18 NOTE — PROGRESS NOTES
Hernandez Woodruff- I am glad you got into see Dr. Misa Pineda. He is a very nice person to work with. Your labs are NOT suggestive of Celiac disease.     Dr. Luciana Alanis

## 2022-05-05 ENCOUNTER — MED REC SCAN ONLY (OUTPATIENT)
Dept: FAMILY MEDICINE CLINIC | Facility: CLINIC | Age: 47
End: 2022-05-05

## 2022-05-26 ENCOUNTER — LAB ENCOUNTER (OUTPATIENT)
Dept: LAB | Facility: HOSPITAL | Age: 47
End: 2022-05-26
Attending: INTERNAL MEDICINE
Payer: COMMERCIAL

## 2022-05-26 DIAGNOSIS — G89.29 CHRONIC RLQ PAIN: ICD-10-CM

## 2022-05-26 DIAGNOSIS — R14.0 ABDOMINAL BLOATING: ICD-10-CM

## 2022-05-26 DIAGNOSIS — F43.9 STRESS: ICD-10-CM

## 2022-05-26 DIAGNOSIS — E55.9 VITAMIN D DEFICIENCY: ICD-10-CM

## 2022-05-26 DIAGNOSIS — E66.3 OVERWEIGHT (BMI 25.0-29.9): ICD-10-CM

## 2022-05-26 DIAGNOSIS — R10.31 CHRONIC RLQ PAIN: ICD-10-CM

## 2022-05-26 LAB
FOLATE SERPL-MCNC: 19.2 NG/ML (ref 8.7–?)
VIT B12 SERPL-MCNC: 769 PG/ML (ref 193–986)
VIT D+METAB SERPL-MCNC: 38.2 NG/ML (ref 30–100)

## 2022-05-26 PROCEDURE — 82746 ASSAY OF FOLIC ACID SERUM: CPT

## 2022-05-26 PROCEDURE — 36415 COLL VENOUS BLD VENIPUNCTURE: CPT

## 2022-05-26 PROCEDURE — 82607 VITAMIN B-12: CPT

## 2022-05-26 PROCEDURE — 82306 VITAMIN D 25 HYDROXY: CPT

## 2022-06-21 ENCOUNTER — HOSPITAL ENCOUNTER (OUTPATIENT)
Dept: ULTRASOUND IMAGING | Facility: HOSPITAL | Age: 47
Discharge: HOME | End: 2022-06-21
Attending: INTERNAL MEDICINE
Payer: COMMERCIAL

## 2022-06-21 DIAGNOSIS — G89.29 CHRONIC RLQ PAIN: ICD-10-CM

## 2022-06-21 DIAGNOSIS — R14.0 ABDOMINAL BLOATING: ICD-10-CM

## 2022-06-21 DIAGNOSIS — R10.31 CHRONIC RLQ PAIN: ICD-10-CM

## 2022-06-21 PROCEDURE — 76705 ECHO EXAM OF ABDOMEN: CPT | Performed by: INTERNAL MEDICINE

## 2022-06-23 ENCOUNTER — NURSE TRIAGE (OUTPATIENT)
Dept: FAMILY MEDICINE CLINIC | Facility: CLINIC | Age: 47
End: 2022-06-23

## 2022-06-23 NOTE — TELEPHONE ENCOUNTER
---- Message -----  From: Mickey Chowdhury  Sent: 6/23/2022   7:52 AM CDT  To: Em Rn Triage  Subject: Bites on my legs                                 I have these bites that I noticed on Monday morning. They have been quite swollen and are itchy. Do you think I need to be seen?

## 2022-06-23 NOTE — TELEPHONE ENCOUNTER
Left a detailed message to cell (ok per BLADIMIR) regarding note below.  Advised I will place Dr. Symone Liu recommendations to her my chart and to call back as needed

## 2022-06-23 NOTE — TELEPHONE ENCOUNTER
This is not Shingles and consistent with urticaria from insect bite. Would recommend cool compresses, Benadryl 25-50 mg every 6 hours as tolerated, Zyrtec 10 mg daily, +/- Pepcid 20 mg twice daily for antihistamine effect, and possible topical OTC hydrocortisone if needed. As long as no systemic symptoms and improving does not need to be seen, but go to ED if any swelling of the tongue/lip or any vomiting or respiratory distress.

## 2022-08-02 ENCOUNTER — PATIENT MESSAGE (OUTPATIENT)
Dept: GASTROENTEROLOGY | Facility: CLINIC | Age: 47
End: 2022-08-02

## 2022-08-03 RX ORDER — LINACLOTIDE 72 UG/1
72 CAPSULE, GELATIN COATED ORAL
Qty: 90 CAPSULE | Refills: 1 | Status: SHIPPED | OUTPATIENT
Start: 2022-08-03 | End: 2022-11-01

## 2022-08-03 NOTE — TELEPHONE ENCOUNTER
From: Lalitha Chowdhury  To: Remi Watson MD  Sent: 8/2/2022 4:37 PM CDT  Subject: Linzess    Hi, I have a prescription but I need a 3 month at a time because the one month and 3 month supply both cost $90 from [facility]. Thank you!

## 2022-09-04 RX ORDER — BUPROPION HYDROCHLORIDE 150 MG/1
150 TABLET ORAL DAILY
Qty: 90 TABLET | Refills: 1 | Status: SHIPPED | OUTPATIENT
Start: 2022-09-04 | End: 2022-09-21 | Stop reason: DRUGHIGH

## 2022-09-04 NOTE — TELEPHONE ENCOUNTER
Refill passed per [facility] protocol.     Requested Prescriptions   Pending Prescriptions Disp Refills    BUPROPION  MG Oral Tablet 24 Hr [Pharmacy Med Name: buPROPion HCl ER (XL) 150 MG Oral Tablet Extended Release 24 Hour] 90 tablet 3     Sig: TAKE 1 TABLET BY MOUTH  DAILY        Psychiatric Non-Scheduled (Anti-Anxiety) Passed - 9/4/2022  6:24 AM        Passed - In person appointment or virtual visit in the past 6 mos or appointment in next 3 mos       Recent Outpatient Visits              3 months ago Yumiko Campos MD    Office Visit    4 months ago Yumiko Campos MD    Office Visit    5 months ago Abdominal pain, unspecified abdominal location    [facility], [address], [place], Casper Lazaro MD    Office Visit    5 months ago Right upper quadrant abdominal pain    [address], [place]    Office Visit    9 months ago Right upper quadrant abdominal pain    Surgery - [address] [place] [address], [place], Roderick Bence, MD    Office Visit     Future Appointments         Provider Department Appt Notes    In 2 weeks Matteo Alcala, [address] [facility] Overall health and sleeping                     Recent Outpatient Visits              3 months ago [address] [facility], Héctor Cowart MD    Office Visit    4 months ago [facility], [address] [facility], Héctor Cowart MD    Office Visit    5 months ago Abdominal pain, unspecified abdominal location    [facility], [address], [place], Casper Lazaro MD    Office Visit    5 months ago Right upper quadrant abdominal pain    [address] [place], [place]    Office Visit    9 months ago Right upper quadrant abdominal pain    Surgery - Mary Yang MD    Office Visit            Future Appointments         Provider Department Appt Notes    In 2 weeks Carline Avila, [address] [place] [address] [address] Overall health and sleeping

## 2022-09-21 ENCOUNTER — OFFICE VISIT (OUTPATIENT)
Dept: FAMILY MEDICINE CLINIC | Facility: CLINIC | Age: 47
End: 2022-09-21

## 2022-09-21 VITALS
SYSTOLIC BLOOD PRESSURE: 122 MMHG | OXYGEN SATURATION: 96 % | HEIGHT: 66.6 IN | BODY MASS INDEX: 28.11 KG/M2 | DIASTOLIC BLOOD PRESSURE: 78 MMHG | WEIGHT: 177 LBS | HEART RATE: 67 BPM

## 2022-09-21 DIAGNOSIS — F41.9 ANXIETY: ICD-10-CM

## 2022-09-21 DIAGNOSIS — Z00.00 ENCOUNTER FOR ROUTINE ADULT HEALTH EXAMINATION WITHOUT ABNORMAL FINDINGS: Primary | ICD-10-CM

## 2022-09-21 DIAGNOSIS — K59.09 CHRONIC CONSTIPATION: ICD-10-CM

## 2022-09-21 DIAGNOSIS — E88.81 INSULIN RESISTANCE: ICD-10-CM

## 2022-09-21 DIAGNOSIS — E66.3 OVERWEIGHT (BMI 25.0-29.9): ICD-10-CM

## 2022-09-21 PROCEDURE — 3074F SYST BP LT 130 MM HG: CPT | Performed by: FAMILY MEDICINE

## 2022-09-21 PROCEDURE — 99396 PREV VISIT EST AGE 40-64: CPT | Performed by: FAMILY MEDICINE

## 2022-09-21 PROCEDURE — 3008F BODY MASS INDEX DOCD: CPT | Performed by: FAMILY MEDICINE

## 2022-09-21 PROCEDURE — 99213 OFFICE O/P EST LOW 20 MIN: CPT | Performed by: FAMILY MEDICINE

## 2022-09-21 PROCEDURE — 3078F DIAST BP <80 MM HG: CPT | Performed by: FAMILY MEDICINE

## 2022-09-21 RX ORDER — BUPROPION HYDROCHLORIDE 75 MG/1
75 TABLET ORAL DAILY
Qty: 30 TABLET | Refills: 0 | Status: SHIPPED | OUTPATIENT
Start: 2022-09-21

## 2022-09-26 ENCOUNTER — LAB ENCOUNTER (OUTPATIENT)
Dept: LAB | Facility: REFERENCE LAB | Age: 47
End: 2022-09-26
Attending: FAMILY MEDICINE

## 2022-09-26 DIAGNOSIS — Z00.00 ENCOUNTER FOR ROUTINE ADULT HEALTH EXAMINATION WITHOUT ABNORMAL FINDINGS: ICD-10-CM

## 2022-09-26 LAB
BASOPHILS # BLD AUTO: 0.05 X10(3) UL (ref 0–0.2)
BASOPHILS NFR BLD AUTO: 0.8 %
CHOLEST SERPL-MCNC: 179 MG/DL (ref ?–200)
DEPRECATED RDW RBC AUTO: 44.3 FL (ref 35.1–46.3)
EOSINOPHIL # BLD AUTO: 0.19 X10(3) UL (ref 0–0.7)
EOSINOPHIL NFR BLD AUTO: 2.9 %
ERYTHROCYTE [DISTWIDTH] IN BLOOD BY AUTOMATED COUNT: 11.8 % (ref 11–15)
EST. AVERAGE GLUCOSE BLD GHB EST-MCNC: 105 MG/DL (ref 68–126)
FASTING PATIENT LIPID ANSWER: YES
HBA1C MFR BLD: 5.3 % (ref ?–5.7)
HCT VFR BLD AUTO: 40.4 %
HDLC SERPL-MCNC: 75 MG/DL (ref 40–59)
HGB BLD-MCNC: 13.1 G/DL
IMM GRANULOCYTES # BLD AUTO: 0.02 X10(3) UL (ref 0–1)
IMM GRANULOCYTES NFR BLD: 0.3 %
INSULIN SERPL-ACNC: 5.8 MU/L (ref 3–25)
LDLC SERPL CALC-MCNC: 86 MG/DL (ref ?–100)
LYMPHOCYTES # BLD AUTO: 1.92 X10(3) UL (ref 1–4)
LYMPHOCYTES NFR BLD AUTO: 29 %
MCH RBC QN AUTO: 32.9 PG (ref 26–34)
MCHC RBC AUTO-ENTMCNC: 32.4 G/DL (ref 31–37)
MCV RBC AUTO: 101.5 FL
MONOCYTES # BLD AUTO: 0.41 X10(3) UL (ref 0.1–1)
MONOCYTES NFR BLD AUTO: 6.2 %
NEUTROPHILS # BLD AUTO: 4.03 X10 (3) UL (ref 1.5–7.7)
NEUTROPHILS # BLD AUTO: 4.03 X10(3) UL (ref 1.5–7.7)
NEUTROPHILS NFR BLD AUTO: 60.8 %
NONHDLC SERPL-MCNC: 104 MG/DL (ref ?–130)
PLATELET # BLD AUTO: 261 10(3)UL (ref 150–450)
RBC # BLD AUTO: 3.98 X10(6)UL
TRIGL SERPL-MCNC: 99 MG/DL (ref 30–149)
VLDLC SERPL CALC-MCNC: 16 MG/DL (ref 0–30)
WBC # BLD AUTO: 6.6 X10(3) UL (ref 4–11)

## 2022-09-26 PROCEDURE — 36415 COLL VENOUS BLD VENIPUNCTURE: CPT

## 2022-09-26 PROCEDURE — 80061 LIPID PANEL: CPT

## 2022-09-26 PROCEDURE — 83525 ASSAY OF INSULIN: CPT

## 2022-09-26 PROCEDURE — 85025 COMPLETE CBC W/AUTO DIFF WBC: CPT

## 2022-09-26 PROCEDURE — 83036 HEMOGLOBIN GLYCOSYLATED A1C: CPT

## 2022-09-27 ENCOUNTER — PATIENT MESSAGE (OUTPATIENT)
Dept: FAMILY MEDICINE CLINIC | Facility: CLINIC | Age: 47
End: 2022-09-27

## 2022-09-27 RX ORDER — TIRZEPATIDE 2.5 MG/.5ML
2.5 INJECTION, SOLUTION SUBCUTANEOUS WEEKLY
Qty: 2 ML | Refills: 0 | Status: SHIPPED | OUTPATIENT
Start: 2022-09-27

## 2022-12-07 ENCOUNTER — PATIENT MESSAGE (OUTPATIENT)
Dept: FAMILY MEDICINE CLINIC | Facility: CLINIC | Age: 47
End: 2022-12-07

## 2022-12-08 RX ORDER — TIRZEPATIDE 5 MG/.5ML
5 INJECTION, SOLUTION SUBCUTANEOUS WEEKLY
Qty: 6 ML | Refills: 0 | Status: SHIPPED | OUTPATIENT
Start: 2022-12-08

## 2022-12-08 NOTE — TELEPHONE ENCOUNTER
DR Santos Daniel:  Last visit 9/21/22; started on Mounjaro 2.5mg weekly for weight loss and return in 3 months for f/u. Do you approve to use SDS slot later this month? Or change dosage? No openings until after January 25. FYI    From: Kelsey Chowdhury  To: Nj Armenta,   Sent: 12/7/2022  1:15 PM CST  Subject: Oral Matas Dr. Santos Daniel! Can we up my dose to 5ML? Thank you!

## 2023-01-20 ENCOUNTER — MED REC SCAN ONLY (OUTPATIENT)
Facility: CLINIC | Age: 48
End: 2023-01-20

## 2023-02-21 ENCOUNTER — PATIENT MESSAGE (OUTPATIENT)
Facility: CLINIC | Age: 48
End: 2023-02-21

## 2023-02-21 DIAGNOSIS — Z12.31 BREAST CANCER SCREENING BY MAMMOGRAM: Primary | ICD-10-CM

## 2023-02-21 NOTE — TELEPHONE ENCOUNTER
Per test results 3/23/22 =     RECOMMENDATIONS:   ROUTINE MAMMOGRAM AND CLINICAL EVALUATION IN 12 MONTHS.      Mammogram order placed per protocol      Mounjaro refill pended for triage protocol

## 2023-02-21 NOTE — TELEPHONE ENCOUNTER
Protocol failed or has No Protocol, please review  Requested Prescriptions   Pending Prescriptions Disp Refills    Tirzepatide (MOUNJARO) 5 MG/0.5ML Subcutaneous Solution Pen-injector 6 mL 0     Sig: Inject 5 mg into the skin once a week.        Diabetic Medication Protocol Failed - 2/21/2023  4:42 PM        Failed - Microalbumin procedure in past 12 months or taking ACE/ARB        Passed - HgBA1C procedure resulted in past 6 months        Passed - Last HgBA1C < 7.5     Lab Results   Component Value Date    A1C 5.3 09/26/2022             Passed - Appointment in past 6 or next 3 months             Recent Outpatient Visits              5 months ago Encounter for routine adult health examination without abnormal findings    [facility] Apar[facility], [place]    Office Visit    9 months ago Julien Amador MD    Office Visit    10 months ago Julien Amador MD    Office Visit    10 months ago Abdominal pain, unspecified abdominal location    [facility], [address], [address] [place] [address] MD Candy    Office Visit    11 months ago Right upper quadrant abdominal pain    [facility], [address], Malka Summers, [place]    Office Visit

## 2023-02-22 RX ORDER — TIRZEPATIDE 5 MG/.5ML
5 INJECTION, SOLUTION SUBCUTANEOUS WEEKLY
Qty: 6 ML | Refills: 0 | Status: SHIPPED | OUTPATIENT
Start: 2023-02-22

## 2023-02-22 NOTE — TELEPHONE ENCOUNTER
Noted.     Future Appointments   Date Time Provider Shawanda Angulo   2/23/2023  1:00 PM Dutch Parks DO [facility] FP [facility] OP   4/5/2023 11:40 AM [place] [address]

## 2023-02-23 ENCOUNTER — OFFICE VISIT (OUTPATIENT)
Facility: CLINIC | Age: 48
End: 2023-02-23
Payer: COMMERCIAL

## 2023-02-23 VITALS
HEART RATE: 94 BPM | BODY MASS INDEX: 24.78 KG/M2 | DIASTOLIC BLOOD PRESSURE: 74 MMHG | OXYGEN SATURATION: 97 % | SYSTOLIC BLOOD PRESSURE: 124 MMHG | WEIGHT: 156 LBS | HEIGHT: 66.6 IN

## 2023-02-23 DIAGNOSIS — G89.29 CHRONIC RUQ PAIN: ICD-10-CM

## 2023-02-23 DIAGNOSIS — K59.09 CHRONIC CONSTIPATION: ICD-10-CM

## 2023-02-23 DIAGNOSIS — E66.3 OVERWEIGHT (BMI 25.0-29.9): Primary | ICD-10-CM

## 2023-02-23 DIAGNOSIS — R10.11 CHRONIC RUQ PAIN: ICD-10-CM

## 2023-02-23 PROCEDURE — 3074F SYST BP LT 130 MM HG: CPT | Performed by: FAMILY MEDICINE

## 2023-02-23 PROCEDURE — 99214 OFFICE O/P EST MOD 30 MIN: CPT | Performed by: FAMILY MEDICINE

## 2023-02-23 PROCEDURE — 3078F DIAST BP <80 MM HG: CPT | Performed by: FAMILY MEDICINE

## 2023-02-23 PROCEDURE — 3008F BODY MASS INDEX DOCD: CPT | Performed by: FAMILY MEDICINE

## 2023-03-01 ENCOUNTER — MED REC SCAN ONLY (OUTPATIENT)
Facility: CLINIC | Age: 48
End: 2023-03-01

## 2023-03-01 ENCOUNTER — TELEPHONE (OUTPATIENT)
Facility: CLINIC | Age: 48
End: 2023-03-01

## 2023-03-01 DIAGNOSIS — R10.11 RUQ PAIN: Primary | ICD-10-CM

## 2023-03-01 NOTE — TELEPHONE ENCOUNTER
Dr. Asya Goodrich,     Pain clinic referral pended below. Can you please review it and sign off if appropriate.

## 2023-04-07 ENCOUNTER — OFFICE VISIT (OUTPATIENT)
Dept: PAIN CLINIC | Facility: HOSPITAL | Age: 48
End: 2023-04-07
Attending: ANESTHESIOLOGY
Payer: COMMERCIAL

## 2023-04-07 ENCOUNTER — TELEPHONE (OUTPATIENT)
Dept: PAIN CLINIC | Facility: HOSPITAL | Age: 48
End: 2023-04-07

## 2023-04-07 VITALS
HEIGHT: 66.6 IN | RESPIRATION RATE: 18 BRPM | HEART RATE: 75 BPM | BODY MASS INDEX: 24.78 KG/M2 | SYSTOLIC BLOOD PRESSURE: 125 MMHG | DIASTOLIC BLOOD PRESSURE: 78 MMHG | WEIGHT: 156 LBS

## 2023-04-07 DIAGNOSIS — G58.8 ENTRAPMENT SYNDROME OF CUTANEOUS NERVE OF ABDOMEN: Primary | ICD-10-CM

## 2023-04-07 DIAGNOSIS — R10.11 RIGHT UPPER QUADRANT ABDOMINAL PAIN: ICD-10-CM

## 2023-04-07 PROCEDURE — 99202 OFFICE O/P NEW SF 15 MIN: CPT

## 2023-04-07 NOTE — TELEPHONE ENCOUNTER
Right-sided Ultrasound Guided Transversus Abdominis Plane / Rectus Sheath Block - Cpt [id number] - Dx R10.11 - NO PA REQUIRED    PA not required due to procedure being done in-office. In-office procedure scheduled for 4/24/23.

## 2023-04-07 NOTE — PROGRESS NOTES
4/7/2023-presents ambulatory to [facility] to establish care; [address]; pt reports this started 2 yrs ago; she had a colonoscopy & endoscopy and  A CT; gallstones found so she prepped for surgery; Dr Humberto Alves removed the gallbladder; her pain started post op; since the surgery she has had multiple Ultrasounds, blood work  done, to figure out why this pain-results werel negative; referred by Dr. Tello Garcia, and her PCP Dr. Ibeth Cordova; Examined by Dr. Diego Yoo; refer to dictation for the plan of care.

## 2023-04-14 ENCOUNTER — HOSPITAL ENCOUNTER (OUTPATIENT)
Dept: MAMMOGRAPHY | Age: 48
Discharge: HOME | End: 2023-04-14
Attending: FAMILY MEDICINE
Payer: COMMERCIAL

## 2023-04-14 DIAGNOSIS — Z12.31 BREAST CANCER SCREENING BY MAMMOGRAM: ICD-10-CM

## 2023-04-14 PROCEDURE — 77067 SCR MAMMO BI INCL CAD: CPT | Performed by: FAMILY MEDICINE

## 2023-04-14 PROCEDURE — 77063 BREAST TOMOSYNTHESIS BI: CPT | Performed by: FAMILY MEDICINE

## 2023-04-21 PROBLEM — R10.11 RUQ PAIN: Status: ACTIVE | Noted: 2023-04-21

## 2023-04-21 PROBLEM — G58.8 ENTRAPMENT SYNDROME OF CUTANEOUS NERVE OF ABDOMEN: Status: ACTIVE | Noted: 2023-04-21

## 2023-04-24 ENCOUNTER — OFFICE VISIT (OUTPATIENT)
Dept: PAIN CLINIC | Facility: HOSPITAL | Age: 48
End: 2023-04-24
Attending: ANESTHESIOLOGY
Payer: COMMERCIAL

## 2023-04-24 VITALS
WEIGHT: 156 LBS | RESPIRATION RATE: 18 BRPM | SYSTOLIC BLOOD PRESSURE: 119 MMHG | HEART RATE: 62 BPM | DIASTOLIC BLOOD PRESSURE: 79 MMHG | BODY MASS INDEX: 24.78 KG/M2 | HEIGHT: 66.6 IN

## 2023-04-24 DIAGNOSIS — G58.8 ENTRAPMENT SYNDROME OF CUTANEOUS NERVE OF ABDOMEN: ICD-10-CM

## 2023-04-24 DIAGNOSIS — R10.11 RIGHT UPPER QUADRANT ABDOMINAL PAIN: Primary | ICD-10-CM

## 2023-04-24 PROCEDURE — 99211 OFF/OP EST MAY X REQ PHY/QHP: CPT

## 2023-04-24 RX ORDER — METHYLPREDNISOLONE ACETATE 40 MG/ML
40 INJECTION, SUSPENSION INTRA-ARTICULAR; INTRALESIONAL; INTRAMUSCULAR; SOFT TISSUE ONCE
Status: DISCONTINUED | OUTPATIENT
Start: 2023-04-24 | End: 2023-04-24

## 2023-04-24 RX ORDER — BUPIVACAINE HYDROCHLORIDE 2.5 MG/ML
10 INJECTION, SOLUTION EPIDURAL; INFILTRATION; INTRACAUDAL ONCE
Status: DISCONTINUED | OUTPATIENT
Start: 2023-04-24 | End: 2023-04-24

## 2023-04-24 NOTE — PROGRESS NOTES
4/24/2023-Presents ambulatory to [facility]; f/u ABDOMINAL PAIN; returns to for in office procedure -ULTRASOUND GUIDED RT TRANSVERSE ABDOMINUS/RECTUS SHEATH BLOCK; rates her pain  5/10  ; seen by Dr. Chiquita Sanchez; rever to dictation for the plan of care.

## 2023-05-18 ENCOUNTER — OFFICE VISIT (OUTPATIENT)
Dept: PAIN CLINIC | Facility: HOSPITAL | Age: 48
End: 2023-05-18
Attending: ANESTHESIOLOGY
Payer: COMMERCIAL

## 2023-05-18 VITALS
DIASTOLIC BLOOD PRESSURE: 81 MMHG | HEIGHT: 66.6 IN | RESPIRATION RATE: 18 BRPM | BODY MASS INDEX: 24.78 KG/M2 | WEIGHT: 156 LBS | HEART RATE: 71 BPM | SYSTOLIC BLOOD PRESSURE: 115 MMHG

## 2023-05-18 DIAGNOSIS — R10.11 RIGHT UPPER QUADRANT ABDOMINAL PAIN: Primary | ICD-10-CM

## 2023-05-18 DIAGNOSIS — M53.3 COCCYGEAL PAIN: ICD-10-CM

## 2023-05-18 DIAGNOSIS — R10.11 RUQ PAIN: ICD-10-CM

## 2023-05-18 DIAGNOSIS — G58.8 ENTRAPMENT SYNDROME OF CUTANEOUS NERVE OF ABDOMEN: ICD-10-CM

## 2023-05-18 PROCEDURE — 99211 OFF/OP EST MAY X REQ PHY/QHP: CPT

## 2023-05-18 RX ORDER — BUPIVACAINE HYDROCHLORIDE 2.5 MG/ML
10 INJECTION, SOLUTION EPIDURAL; INFILTRATION; INTRACAUDAL ONCE
Status: ACTIVE | OUTPATIENT
Start: 2023-05-18

## 2023-05-18 RX ORDER — METHYLPREDNISOLONE ACETATE 40 MG/ML
40 INJECTION, SUSPENSION INTRA-ARTICULAR; INTRALESIONAL; INTRAMUSCULAR; SOFT TISSUE ONCE
Status: ACTIVE | OUTPATIENT
Start: 2023-05-18

## 2023-05-18 NOTE — PROGRESS NOTES
5/18/2023-presents ambulatory to [facility]; F/U POST-4/24/2023-US GUIDED RT TRANSVERSE ABDOMINUS/RECTUS SHEATH BLK  ---RAFAELA--IN OFFICE PROCEDURE-; pt reports 85% improvement post injection; today her pain 4/10; seen by Dr. Jonatan Sidhu; refer to dictation for the plan of care.

## 2023-05-19 PROBLEM — M53.3 COCCYGEAL PAIN: Status: ACTIVE | Noted: 2023-05-19

## 2023-05-30 RX ORDER — TIRZEPATIDE 5 MG/.5ML
INJECTION, SOLUTION SUBCUTANEOUS
Qty: 6 ML | Refills: 0 | Status: SHIPPED | OUTPATIENT
Start: 2023-05-30

## 2023-05-31 ENCOUNTER — PATIENT MESSAGE (OUTPATIENT)
Facility: CLINIC | Age: 48
End: 2023-05-31

## 2023-06-02 NOTE — TELEPHONE ENCOUNTER
From: Odilia Chowdhury  To: Chica Wood DO  Sent: 5/31/2023 6:27 PM CDT  Subject: Rojelio Ng! My prescription needs to be filled and [facility] is back ordered a couple of weeks. Is there any way I could get a sample to cover for a few weeks?      Thank you!!!

## 2023-06-27 ENCOUNTER — TELEPHONE (OUTPATIENT)
Dept: PAIN CLINIC | Facility: HOSPITAL | Age: 48
End: 2023-06-27

## 2023-07-10 NOTE — DISCHARGE INSTRUCTIONS
POST PROCEDURE CARE AND INFECTION PREVENTION:    1. Your dressing should be removed within 24 hours. If it falls off before that time, you need not reapply. 2.   You may shower tomorrow. 3.   If necessary, you may apply ice to the injection site for 20 minute intervals to help alleviate any localized discomfort. 4.  The [facility] office number is [phone]. Call and report the following conditions to the [facility]:   -Any excessive bleeding, swelling, or pain at the injection site.   -Any temperature greater than 101 degrees.   -Any excessive itch or rash. -Any change in your bowel or bladder habits.   -Any increased numbness, tingling, or weakness to the extremities. -Any persistent, severe headache (especially a headache aggravated by being in   An upright position. The office is open between the hours of 7:30 am - 2:00pm.  After hours you may leave a message and the nurse will return your call during normal office hours. 5.  Please call the [facility] in one week to schedule an appointment for your follow up visit unless instructed differently by your doctor. If you need to cancel or reschedule any appointment, please call as soon as possible. 6.  You may return to school or work per your doctor's instructions. 7.  Wash your hands before and after touching your dressing. Be sure to rub your hands together with warm water and soap for 20 seconds or longer when washing. MEDICATION:    1. You may resume all your usual medications unless instructed otherwise by your doctor. 2.  To alleviate pain, you may take your over the counter or prescription pain medications as per the label instructions. 3.  Se Medication Reconciliation form for any new prescriptions. 4.  Do not drive, operate heavy machinery, or drink alcoholic beverages while taking narcotic pain medication. Narcotic pain medication may cause constipation. If no bowel movement in 48 hours, please contact your physician. IN CASE OF EMERGENCY:  If you are unable to reach your doctor, call or go to the nearest emergency room. The [facility] Emergency Department's phone number is [phone]. INSTRUCTIONS FOR PATIENT WITH GENERAL/IV SEDATION ONLY:  1. Begin clear liquids and bland foods then progress to your normal diet if you are not nauseated. 2.  Nausea and vomiting occasionally occur after surgery. If you are nauseated, remain on clear liquids until it passes. If nausea persists longer than 24 hours, please notify your doctor. 3.  Rest on the day of surgery. You may increase activity as tolerated starting tomorrow. 4. Do not drive, operate hazardous machinery, or make important personal or legal decisions for 24 hours. 5.  You may feel dizzy or lightheaded from anesthesia. Move cautiously for 24 hours.

## 2023-07-12 ENCOUNTER — ANESTHESIA (OUTPATIENT)
Dept: SURGERY | Facility: HOSPITAL | Age: 48
End: 2023-07-12
Payer: COMMERCIAL

## 2023-07-12 ENCOUNTER — HOSPITAL ENCOUNTER (OUTPATIENT)
Facility: HOSPITAL | Age: 48
Setting detail: HOSPITAL OUTPATIENT SURGERY
Discharge: HOME | End: 2023-07-12
Attending: ANESTHESIOLOGY | Admitting: ANESTHESIOLOGY
Payer: COMMERCIAL

## 2023-07-12 ENCOUNTER — APPOINTMENT (OUTPATIENT)
Dept: GENERAL RADIOLOGY | Facility: HOSPITAL | Age: 48
End: 2023-07-12
Attending: ANESTHESIOLOGY
Payer: COMMERCIAL

## 2023-07-12 ENCOUNTER — ANESTHESIA EVENT (OUTPATIENT)
Dept: SURGERY | Facility: HOSPITAL | Age: 48
End: 2023-07-12
Payer: COMMERCIAL

## 2023-07-12 VITALS
HEART RATE: 67 BPM | BODY MASS INDEX: 23.07 KG/M2 | SYSTOLIC BLOOD PRESSURE: 116 MMHG | TEMPERATURE: 98 F | DIASTOLIC BLOOD PRESSURE: 78 MMHG | WEIGHT: 147 LBS | HEIGHT: 67 IN | OXYGEN SATURATION: 100 % | RESPIRATION RATE: 16 BRPM

## 2023-07-12 LAB — GLUCOSE BLDC GLUCOMTR-MCNC: 76 MG/DL (ref 70–99)

## 2023-07-12 PROCEDURE — 3E0T33Z INTRODUCTION OF ANTI-INFLAMMATORY INTO PERIPHERAL NERVES AND PLEXI, PERCUTANEOUS APPROACH: ICD-10-PCS | Performed by: ANESTHESIOLOGY

## 2023-07-12 PROCEDURE — 3E0T3BZ INTRODUCTION OF ANESTHETIC AGENT INTO PERIPHERAL NERVES AND PLEXI, PERCUTANEOUS APPROACH: ICD-10-PCS | Performed by: ANESTHESIOLOGY

## 2023-07-12 PROCEDURE — 82962 GLUCOSE BLOOD TEST: CPT

## 2023-07-12 RX ORDER — HYDROMORPHONE HYDROCHLORIDE 1 MG/ML
0.2 INJECTION, SOLUTION INTRAMUSCULAR; INTRAVENOUS; SUBCUTANEOUS EVERY 5 MIN PRN
OUTPATIENT
Start: 2023-07-12

## 2023-07-12 RX ORDER — MORPHINE SULFATE 10 MG/ML
6 INJECTION, SOLUTION INTRAMUSCULAR; INTRAVENOUS EVERY 10 MIN PRN
OUTPATIENT
Start: 2023-07-12

## 2023-07-12 RX ORDER — SODIUM CHLORIDE, SODIUM LACTATE, POTASSIUM CHLORIDE, CALCIUM CHLORIDE 600; 310; 30; 20 MG/100ML; MG/100ML; MG/100ML; MG/100ML
INJECTION, SOLUTION INTRAVENOUS CONTINUOUS
Status: DISCONTINUED | OUTPATIENT
Start: 2023-07-12 | End: 2023-07-12

## 2023-07-12 RX ORDER — NICOTINE POLACRILEX 4 MG
30 LOZENGE BUCCAL
Status: DISCONTINUED | OUTPATIENT
Start: 2023-07-12 | End: 2023-07-12 | Stop reason: HOSPADM

## 2023-07-12 RX ORDER — HYDROMORPHONE HYDROCHLORIDE 1 MG/ML
0.6 INJECTION, SOLUTION INTRAMUSCULAR; INTRAVENOUS; SUBCUTANEOUS EVERY 5 MIN PRN
OUTPATIENT
Start: 2023-07-12

## 2023-07-12 RX ORDER — BUPIVACAINE HYDROCHLORIDE 2.5 MG/ML
INJECTION, SOLUTION EPIDURAL; INFILTRATION; INTRACAUDAL AS NEEDED
Status: DISCONTINUED | OUTPATIENT
Start: 2023-07-12 | End: 2023-07-12 | Stop reason: HOSPADM

## 2023-07-12 RX ORDER — MORPHINE SULFATE 4 MG/ML
4 INJECTION, SOLUTION INTRAMUSCULAR; INTRAVENOUS EVERY 10 MIN PRN
OUTPATIENT
Start: 2023-07-12

## 2023-07-12 RX ORDER — NALOXONE HYDROCHLORIDE 0.4 MG/ML
80 INJECTION, SOLUTION INTRAMUSCULAR; INTRAVENOUS; SUBCUTANEOUS AS NEEDED
OUTPATIENT
Start: 2023-07-12 | End: 2023-07-12

## 2023-07-12 RX ORDER — DEXAMETHASONE SODIUM PHOSPHATE 10 MG/ML
INJECTION, SOLUTION INTRAMUSCULAR; INTRAVENOUS AS NEEDED
Status: DISCONTINUED | OUTPATIENT
Start: 2023-07-12 | End: 2023-07-12 | Stop reason: HOSPADM

## 2023-07-12 RX ORDER — SODIUM CHLORIDE, SODIUM LACTATE, POTASSIUM CHLORIDE, CALCIUM CHLORIDE 600; 310; 30; 20 MG/100ML; MG/100ML; MG/100ML; MG/100ML
INJECTION, SOLUTION INTRAVENOUS CONTINUOUS
OUTPATIENT
Start: 2023-07-12

## 2023-07-12 RX ORDER — LIDOCAINE HYDROCHLORIDE 10 MG/ML
INJECTION, SOLUTION EPIDURAL; INFILTRATION; INTRACAUDAL; PERINEURAL AS NEEDED
Status: DISCONTINUED | OUTPATIENT
Start: 2023-07-12 | End: 2023-07-12 | Stop reason: HOSPADM

## 2023-07-12 RX ORDER — MORPHINE SULFATE 2 MG/ML
2 INJECTION, SOLUTION INTRAMUSCULAR; INTRAVENOUS EVERY 10 MIN PRN
OUTPATIENT
Start: 2023-07-12

## 2023-07-12 RX ORDER — HYDROMORPHONE HYDROCHLORIDE 1 MG/ML
0.4 INJECTION, SOLUTION INTRAMUSCULAR; INTRAVENOUS; SUBCUTANEOUS EVERY 5 MIN PRN
OUTPATIENT
Start: 2023-07-12

## 2023-07-12 RX ORDER — DEXTROSE MONOHYDRATE 25 G/50ML
50 INJECTION, SOLUTION INTRAVENOUS
Status: DISCONTINUED | OUTPATIENT
Start: 2023-07-12 | End: 2023-07-12 | Stop reason: HOSPADM

## 2023-07-12 RX ORDER — NICOTINE POLACRILEX 4 MG
15 LOZENGE BUCCAL
Status: DISCONTINUED | OUTPATIENT
Start: 2023-07-12 | End: 2023-07-12 | Stop reason: HOSPADM

## 2023-07-12 RX ORDER — ACETAMINOPHEN 500 MG
1000 TABLET ORAL ONCE
Status: COMPLETED | OUTPATIENT
Start: 2023-07-12 | End: 2023-07-12

## 2023-07-12 NOTE — BRIEF OP NOTE
OPERATIVE NOTE       Date of Service: 07/12/2023    YOB: 1975    Procedure Name: Ganglion impar injection with fluoroscopic needle guidance    Pre-procedure diagnosis: coccygeal pain     Post-procedure diagnosis: coccygeal pain     Surgeon: Gurpreet Maguire DO     Anesthesia: local     Complications: none     Indication: 50year old female with hx of remote trauma to the tailbone who presented to the Pain Clinic with complaint of pain at and around the coccyx, which failed to respond to conservative treatments including pain medications. She was recommended the above injection in hopes of relieving the pain and restoring normal function. She desires to proceed with the injection as scheduled. The patient was informed of potential adverse events related to the injection, benefits of the treatment and alternative treatments. She expressed understanding and accepting these risks. She gave permission to proceed with the scheduled injection and signed informed written consent. TECHNIQUE: Time-out was taken to identify the correct patient, procedure and side prior to starting the procedure. The patient was placed in prone position on the table with pillow under the pelvis. The procedure area was widely prepped with chlorhexidine and draped with sterile towels in the usual sterile fashion. Standard anesthesia monitors were connected. The patient remained fully awake and responsive throughout the procedure. Lateral view was used to identify the landmarks; needle entry site was marked and overlying skin and tissue was infiltrated with 1% lidocaine. Next, 20-gauge quincke needle was introduced through the sacrococcygeal ligament and was advanced anteriorly to reach the anterior edge of the sacrum. When in the appropriate location, 4cc of Omnipaque 240 was injected under live fluoroscopy to confirm the correct final needle placement and exclude vascular runoff.  Dye spread was visualized along the anterior edge of the sacrum. Proper spread was confirmed with the AP fluoroscopic view. Then, injectate consisting of 10mg methylprednisolone mixed with 9 mL of 0.25% bupivacaine was injected after negative aspiration for fluid, blood and air. The procedure was completed without complications and was tolerated well. The patient was monitored after the procedure in recovery. Neurologic exam was intact and unchanged from the preop. The patient reported improvement of her pain In recovery. She was given post-procedure and discharge instructions to follow at home. The patient was discharged in stable condition. A follow-up appointment was made in 2 weeks.        Vel Mcgrath DO  Anesthesiology  Pain Medicine

## 2023-08-03 ENCOUNTER — TELEPHONE (OUTPATIENT)
Dept: PAIN CLINIC | Facility: HOSPITAL | Age: 48
End: 2023-08-03

## 2023-08-24 RX ORDER — TIRZEPATIDE 5 MG/.5ML
5 INJECTION, SOLUTION SUBCUTANEOUS WEEKLY
Qty: 6 ML | Refills: 0 | Status: SHIPPED | OUTPATIENT
Start: 2023-08-24

## 2023-08-24 NOTE — TELEPHONE ENCOUNTER
Please review. Protocol failed / Has no protocol. Asking for refill to get to appointment date:  Future Appointments   Date Time Provider Shawanda Angulo   10/3/2023  7:30 AM Shen Hameed  [facility] PAIN EM [facility]   10/3/2023 12:30 PM Brandan Godoy DO [address] [facility] 14 FP 1221 [facility] 10 OP        Requested Prescriptions   Pending Prescriptions Disp Refills    MOUNJARO 5 MG/0.5ML Subcutaneous Solution Pen-injector [Pharmacy Med Name: Carolyn Liu 5 MG/0.5 ML PEN]  2     Sig: INJECT 5 MG SUBCUTANEOUSLY WEEKLY       Diabetes Medication Protocol Failed - 8/24/2023 12:33 AM        Failed - Last A1C < 7.5 and within past 6 months     Lab Results   Component Value Date    A1C 5.3 09/26/2022             Failed - EGFRCR or GFRNAA > 50     GFR Evaluation            Failed - GFR in the past 12 months        Passed - In person appointment or virtual visit in the past 6 mos or appointment in next 3 mos     Recent Outpatient Visits              3 months ago Right upper quadrant abdominal pain    [facility] Shen Hameed DO    Office Visit    4 months ago Right upper quadrant abdominal pain    [facility] Shen Hameed DO    Office Visit    4 months ago 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 9)    [address] [place] [address]    Office Visit    11 months ago Encounter for routine adult health examination without abnormal findings    Yordan Rodriguez [address]    Office Visit          Future Appointments         Provider Department Appt Notes    In 1 month [facility] procedure f/u: Ganglion Impar Block 7/12/23  would like another TAP done    In 1 month [facility], [facility], [facility]                  Future Appointments         Provider Department Appt Notes    In 1 month Nunu Davis, [address] for [facility] procedure f/u: Ganglion Impar Block 7/12/23  would like another TAP done    In 1 month Solcolleen John, [address] [place],[address], [facility], [facility]           Recent Outpatient Visits              3 months ago Right upper quadrant abdominal pain    [facility] Nunu Davis DO    Office Visit    4 months ago Right upper quadrant abdominal pain    [facility] Nunu Davis DO    Office Visit    4 months ago 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 9)    aJyy Paredes, [address] [place], [place]    Office Visit    11 months ago Encounter for routine adult health examination without abnormal findings    [address] [place], [place]    Office Visit show

## 2023-09-09 NOTE — TELEPHONE ENCOUNTER
Refill passed per [facility], [facility] protocol. Requested Prescriptions   Pending Prescriptions Disp Refills    SERTRALINE 50 MG Oral Tab [Pharmacy Med Name: SERTRALINE HCL 50 MG TABLET] 90 tablet 1     Sig: TAKE 1 TABLET BY MOUTH EVERY EVENING. 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 9)    [address] [place] [address], [address] [place] [address]    Office Visit    11 months ago Encounter for routine adult health examination without abnormal findings    [facility] [place]    Office Visit          Future Appointments         Provider Department Appt Notes    In 3 weeks Nunu Davis, [address] [facility] procedure f/u: Ganglion Impar Block 7/12/23  would like another TAP done    In 3 weeks [facility], [address], [address], [facility]                  Recent Outpatient Visits              3 months ago Right upper quadrant abdominal pain    [facility] Nunu Davis DO    Office Visit    4 months ago Right upper quadrant abdominal pain    [facility] Nunu Davis DO    Office Visit    5 months ago 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 9)    [facility], [address] [place] [address]    Office Visit    11 months ago Encounter for routine adult health examination without abnormal findings    Yordan [address], [place]    Office Visit          Future Appointments         Provider Department Appt Notes    In 3 weeks Shoaib Garcia, [address] for Pain Management procedure f/u: Ganglion Impar Block 7/12/23  would like another TAP done    In 3 weeks Joao Rodríguez, [address], [facility]

## 2023-10-03 ENCOUNTER — TELEPHONE (OUTPATIENT)
Dept: PAIN CLINIC | Facility: HOSPITAL | Age: 48
End: 2023-10-03

## 2023-10-03 ENCOUNTER — OFFICE VISIT (OUTPATIENT)
Dept: PAIN CLINIC | Facility: HOSPITAL | Age: 48
End: 2023-10-03
Attending: ANESTHESIOLOGY
Payer: COMMERCIAL

## 2023-10-03 ENCOUNTER — OFFICE VISIT (OUTPATIENT)
Facility: CLINIC | Age: 48
End: 2023-10-03
Payer: COMMERCIAL

## 2023-10-03 VITALS — SYSTOLIC BLOOD PRESSURE: 100 MMHG | HEART RATE: 77 BPM | OXYGEN SATURATION: 96 % | DIASTOLIC BLOOD PRESSURE: 68 MMHG

## 2023-10-03 VITALS
HEART RATE: 75 BPM | WEIGHT: 155 LBS | SYSTOLIC BLOOD PRESSURE: 110 MMHG | OXYGEN SATURATION: 100 % | BODY MASS INDEX: 24.33 KG/M2 | DIASTOLIC BLOOD PRESSURE: 68 MMHG | HEIGHT: 67 IN

## 2023-10-03 DIAGNOSIS — Z23 NEED FOR VACCINATION: ICD-10-CM

## 2023-10-03 DIAGNOSIS — E66.3 OVERWEIGHT (BMI 25.0-29.9): ICD-10-CM

## 2023-10-03 DIAGNOSIS — Z13.820 SCREENING FOR OSTEOPOROSIS: ICD-10-CM

## 2023-10-03 DIAGNOSIS — E55.9 VITAMIN D DEFICIENCY: ICD-10-CM

## 2023-10-03 DIAGNOSIS — Z00.00 ENCOUNTER FOR ROUTINE ADULT HEALTH EXAMINATION WITHOUT ABNORMAL FINDINGS: Primary | ICD-10-CM

## 2023-10-03 DIAGNOSIS — R10.11 RIGHT UPPER QUADRANT ABDOMINAL PAIN: ICD-10-CM

## 2023-10-03 DIAGNOSIS — F41.9 ANXIETY: ICD-10-CM

## 2023-10-03 DIAGNOSIS — M53.3 COCCYGEAL PAIN: ICD-10-CM

## 2023-10-03 DIAGNOSIS — F90.0 ATTENTION DEFICIT HYPERACTIVITY DISORDER (ADHD), PREDOMINANTLY INATTENTIVE TYPE: ICD-10-CM

## 2023-10-03 DIAGNOSIS — G58.8 ENTRAPMENT SYNDROME OF CUTANEOUS NERVE OF ABDOMEN: Primary | ICD-10-CM

## 2023-10-03 PROCEDURE — 90471 IMMUNIZATION ADMIN: CPT | Performed by: FAMILY MEDICINE

## 2023-10-03 PROCEDURE — 3078F DIAST BP <80 MM HG: CPT | Performed by: FAMILY MEDICINE

## 2023-10-03 PROCEDURE — 99213 OFFICE O/P EST LOW 20 MIN: CPT | Performed by: FAMILY MEDICINE

## 2023-10-03 PROCEDURE — 3074F SYST BP LT 130 MM HG: CPT | Performed by: FAMILY MEDICINE

## 2023-10-03 PROCEDURE — 99211 OFF/OP EST MAY X REQ PHY/QHP: CPT

## 2023-10-03 PROCEDURE — 90715 TDAP VACCINE 7 YRS/> IM: CPT | Performed by: FAMILY MEDICINE

## 2023-10-03 PROCEDURE — 99396 PREV VISIT EST AGE 40-64: CPT | Performed by: FAMILY MEDICINE

## 2023-10-03 PROCEDURE — 3008F BODY MASS INDEX DOCD: CPT | Performed by: FAMILY MEDICINE

## 2023-10-03 RX ORDER — LISDEXAMFETAMINE DIMESYLATE CAPSULES 20 MG/1
20 CAPSULE ORAL EVERY MORNING
Qty: 30 CAPSULE | Refills: 0 | Status: SHIPPED | OUTPATIENT
Start: 2023-10-03

## 2023-10-03 RX ORDER — TIRZEPATIDE 7.5 MG/.5ML
7.5 INJECTION, SOLUTION SUBCUTANEOUS WEEKLY
Qty: 6 ML | Refills: 0 | Status: SHIPPED | OUTPATIENT
Start: 2023-10-03

## 2023-10-03 NOTE — CHRONIC PAIN
[address] [place] [address] for Pain Management  Follow Up Visit         History of Present Illness:    Fariha Ayers is a 50year old female, referred to the pain clinic for right upper quadrant abdominal pain, which began soon after cholecystectomy surgery 2 years ago by Dr. Katrina Arana. The pt returned to the clinic today for follow up after ultrasound-guided right TAP and rectus sheath block done on 04/24/23 and reported pt reports 85% improvement from the injection lasting for few days after which pain started coming back. Currently, the pain is less extensive than before mostly limited to the outer aspect of the R upper quadrant, and radiates into the right flank and lateral ribcage. Today she rates her pain as a 4/10. The patient also complains of pain around and at her tailbone, which is chronic pain related to past injury/fall. The pain has been bothering her for a long time and failed to get better/resolve with medications and exercise. There was no injection done for this pain and she is interested in attempting one. Blood Thinning Medications:  None    Current Medications:  Current Outpatient Medications   Medication Sig Dispense Refill    sertraline 50 MG Oral Tab Take 1 tablet (50 mg total) by mouth every evening. 90 tablet 3    Tirzepatide (MOUNJARO) 5 MG/0.5ML Subcutaneous Solution Pen-injector Inject 5 mg into the skin once a week.  (Patient not taking: Reported on 10/3/2023) 6 mL 0        Allergies:  No Known Allergies     Review of Systems:  Bowel/Bladder Incontinence:  As above  Numbness/tingling:  As above  Weakness:  As above  Weight Loss:  Negative  Fever:  Negative  Cardiovascular:  No current chest pain or palpitations  Respiratory:  No current shortness of breath  Gastrointestinal:  No active ulcer  Genitourinary:  Negative  Integumentary:  Negative  Psychiatric:  Negative  Hematologic:  No active bleeding  Lymphatic:  No current lymphedema  Allergic/Immunologic: Negative  Musculoskeletal:  As above  Neurological:  As above  Denies chest pain, shortness of breath. Medical History:  Patient Active Problem List:     Anxiety     right C6 radiculopathy     Right lateral epicondylitis     Chronic pain of right thumb     Abdominal pain     Abdominal bloating     Chronic constipation     Overweight (BMI 25.0-29. 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     Coccygeal pain       Past Medical History:   Diagnosis Date    Allergic rhinitis 6    Anxiety High school    Esophageal reflux Pregnancy    Shingles 2019       Surgical History:  Past Surgical History:   Procedure Laterality Date    CHOLECYSTECTOMY  2021    COLONOSCOPY      Normal    COLONOSCOPY  2021    Normal     COLONOSCOPY      [facility]      08, 1/19/10, 3/1/13        Family History:   Family History   Problem Relation Age of Onset    Cancer Father         Lung cancer    Colon Cancer Maternal Grandfather         Social History:  Social History    Socioeconomic History      Marital status:       Spouse name: Not on file      Number of children: Not on file      Years of education: Not on file      Highest education level: Not on file    Occupational History      Not on file    Tobacco Use      Smoking status: Never      Smokeless tobacco: Never    Vaping Use      Vaping Use: Never used    Substance and Sexual Activity      Alcohol use: Yes        Alcohol/week: 1.0 standard drink of alcohol        Types: 1 Glasses of wine per week        Comment: socailly      Drug use: Never      Sexual activity: Yes        Partners: Male    Other Topics      Concerns:        Caffeine Concern: Yes        Exercise: Yes        Seat Belt: Yes        Special Diet: No        Stress Concern: No        Weight Concern: Yes          I lost 30 lbs.  then gained back 20         Service: Not Asked        Blood Transfusions: Not Asked        Occupational Exposure: Not Asked Hobby Hazards: Not Asked        Sleep Concern: Not Asked        Back Care: Not Asked        Bike Helmet: Not Asked        Self-Exams: Not Asked    Social History Narrative      The patient does not use an assistive device. .        The patient does live in a home with stairs. Social Determinants of Health  Financial Resource Strain: Not on file  Food Insecurity: Not on file  Transportation Needs: Not on file  Physical Activity: Not on file  Stress: Not on file  Social Connections: Not on file  Housing Stability: Not on file     Physical Examination:   10/03/23  0738   BP: 100/68   Pulse: 77        General:  Alert and oriented x3  Affect:  NAD  Head:  Normocephalic, atraumatic  Eyes:  anicteric; no injection  CV:  Reg rate  Respiratory:  Non labored breathing on room air   Gait: non antalgic, cane user:  no  Spine:  Normal   Lumbar ROM: normal   Abdomen: soft; non distended; non rigid; active bowel sounds; tenderness to palpation present at the right upper quadrant right under the costal margin and right lower lateral ribcage  Carnett's test: positive for RUQ pain when abdomen flexed and when supine with legs elevated  Neurologic: normal motor strength and intact sensation upper and lower extremities b/l          IMAGING:  PROCEDURE: US ABDOMEN LIMITED (CPT=76705)       COMPARISON:   None. INDICATIONS:   G89.29 Chronic RLQ pain R10.31 Chronic RLQ pain R14.0 Abdominal bloating       TECHNIQUE:   Limited evaluation of the areas indicated in the order with gray scale and colorflow of the main vessels where appropriate. Areas scanned:  Liver gallbladder pancreas and right kidney       FINDINGS:   LIVER:   Normal size, echotexture and color flow. No masses or duct dilatation. Color flow and Doppler imaging of the portal and hepatic veins show patency and antegrade flow. GALLBLADDER:   Cholecystectomy   BILE DUCTS:   Normal.  Common bile duct measures 2.9 mm.      PANCREAS:   Visualized portions are unremarkable. Distal pancreatic tail obscured   OTHER:   No free fluid hepatorenal fossa. No hydronephrosis right kidney. CONCLUSION:   1. Normal hepatic sonogram.  No biliary dilatation. 2. Cholecystectomy. Normal caliber common duct. 3. No free fluid hepatorenal fossa. No hydronephrosis right kidney. 4. Visualized portions the pancreas unremarkable. Distal pancreatic tail obscured               DICTATED BY (CST): Jaimie Rascon MD ON 2022 AT 11:59 AM       FINALIZED BY (CST): Jaimie Rascon MD ON 2022 AT 12:01 PM                ASSESSMENT:  50year old old female with right upper quadrant abdominal pain due to anterior cutaneous nerve entrapment (ACNES) and tailbone pain due to coccydynia. The pt is status post US-guided right sided TAP and rectus sheath block with 85% pain relief confirming the presumed diagnosis. PLAN:  - She wishes to have the block repeated today. Because the pain now mainly located in the right lateral upper quadrant, will only do TAP block without Rectus Sheath. - For the coccygeal pain, recommend ganglion impar block       Pt will return to clinic for follow up 2 weeks after the block      Time spent: 26 minutes         PROCEDURE NOTE           Date of service: 2023    : 11/15/1975    Procedure: Right sided transversus abdominis plane block     Pre-procedure diagnosis: abdominal pain; abdominal cutaneous nerve entrapment     Post-procedure diagnosis: abdominal pain; abdominal cutaneous nerve entrapment     Surgeon: Jagdish Venegas DO       Procedure: The patient was position supine on the procedure table. The area of injection was prepped with chlorhexidine and draped with sterile towels sterile manner. The procedure was performed aseptically. A linear ultrasound transducer was covered with sterile covering and was used in the transverse axis visualize the three lateral abdominal wall muscle layers.  The transducer was placed in the axial plane on the midaxillary line between the subcostal margin and the iliac crest and the three layers of abdominal wall muscles were visualized: external and internal oblique as well as the transversus abdominis muscles. Skin and tissue was infiltrated at the needle placement site. Next, 100mm long echogenic insulated needle was introduced into the skin and the needle tip was advanced under ultrasound guidance until reached the final target location, the fascial plane between the internal oblique and the transversus abdominis muscles. After negative aspiration for blood, fluid and air, 10ml of injectate consisting of 40mg methylprednisolone mixed with 0.25% bupivacaine was injected incrementally. The needle was removed and bandaid was placed over the needle entry points. There was no significant pain or paresthesias during the procedure. There were no signs of local anesthetic toxicity. There were no other complications observed or reported. The patient tolerated procedure well. The patient was monitored for 10 minutes after the block and was discharged home in stable ambulatory condition. The patient reported complete pain relief from the block before leaving the office. The follow up visit is scheduled in 2 weeks.        Carlota Hernandez DO  Anesthesiology  Pain Medicine

## 2023-10-03 NOTE — PROGRESS NOTES
PT presents ambulatory to the [facility].  Pt reports \" I don't know if it helped, its like the juice was not worth squeeze; I don't know\". 2/10 pain today. Dr. Hermine Apgar saw PT for GANGLION IMPAR BLK F/U. See notes for POC.

## 2023-10-03 NOTE — TELEPHONE ENCOUNTER
Diagnostic Ganglion Impar Block - Cpt [id number] - Dx M53.3 - NO PREAUTH REQ    [employer] online, request above is authorized.    Transaction ID # [id number] effective date: 10/03/23 - 01/01/24

## 2023-10-03 NOTE — TELEPHONE ENCOUNTER
Scheduled procedure for: 10/26/23    Procedure follow-up for: 11/08/23 at 8 am.    Surgery Scheduling Form faxed to [address] [place] [address] at 21 [phone].

## 2023-10-24 ENCOUNTER — OFFICE VISIT (OUTPATIENT)
Dept: PAIN CLINIC | Facility: HOSPITAL | Age: 48
End: 2023-10-24
Attending: ANESTHESIOLOGY

## 2023-10-24 ENCOUNTER — LAB ENCOUNTER (OUTPATIENT)
Dept: LAB | Facility: HOSPITAL | Age: 48
End: 2023-10-24
Attending: FAMILY MEDICINE

## 2023-10-24 VITALS — HEART RATE: 58 BPM | DIASTOLIC BLOOD PRESSURE: 80 MMHG | OXYGEN SATURATION: 98 % | SYSTOLIC BLOOD PRESSURE: 115 MMHG

## 2023-10-24 DIAGNOSIS — Z00.00 ENCOUNTER FOR ROUTINE ADULT HEALTH EXAMINATION WITHOUT ABNORMAL FINDINGS: ICD-10-CM

## 2023-10-24 DIAGNOSIS — M53.3 COCCYGEAL PAIN: ICD-10-CM

## 2023-10-24 DIAGNOSIS — G58.8 ENTRAPMENT SYNDROME OF CUTANEOUS NERVE OF ABDOMEN: Primary | ICD-10-CM

## 2023-10-24 DIAGNOSIS — E55.9 VITAMIN D DEFICIENCY: ICD-10-CM

## 2023-10-24 DIAGNOSIS — R10.11 RIGHT UPPER QUADRANT ABDOMINAL PAIN: ICD-10-CM

## 2023-10-24 LAB
ALBUMIN SERPL-MCNC: 4.6 G/DL (ref 3.4–5)
ALBUMIN/GLOB SERPL: 1.2 {RATIO} (ref 1–2)
ALP LIVER SERPL-CCNC: 63 U/L
ALT SERPL-CCNC: 20 U/L
ANION GAP SERPL CALC-SCNC: 5 MMOL/L (ref 0–18)
AST SERPL-CCNC: 17 U/L (ref 15–37)
BASOPHILS # BLD AUTO: 0.03 X10(3) UL (ref 0–0.2)
BASOPHILS NFR BLD AUTO: 0.5 %
BILIRUB SERPL-MCNC: 0.6 MG/DL (ref 0.1–2)
BUN BLD-MCNC: 14 MG/DL (ref 7–18)
BUN/CREAT SERPL: 20 (ref 10–20)
CALCIUM BLD-MCNC: 9.4 MG/DL (ref 8.5–10.1)
CHLORIDE SERPL-SCNC: 105 MMOL/L (ref 98–112)
CHOLEST SERPL-MCNC: 183 MG/DL (ref ?–200)
CO2 SERPL-SCNC: 29 MMOL/L (ref 21–32)
CREAT BLD-MCNC: 0.7 MG/DL
DEPRECATED RDW RBC AUTO: 42.6 FL (ref 35.1–46.3)
EGFRCR SERPLBLD CKD-EPI 2021: 106 ML/MIN/1.73M2 (ref 60–?)
EOSINOPHIL # BLD AUTO: 0.11 X10(3) UL (ref 0–0.7)
EOSINOPHIL NFR BLD AUTO: 1.7 %
ERYTHROCYTE [DISTWIDTH] IN BLOOD BY AUTOMATED COUNT: 11.7 % (ref 11–15)
EST. AVERAGE GLUCOSE BLD GHB EST-MCNC: 97 MG/DL (ref 68–126)
FASTING PATIENT LIPID ANSWER: YES
FASTING STATUS PATIENT QL REPORTED: YES
GLOBULIN PLAS-MCNC: 3.7 G/DL (ref 2.8–4.4)
GLUCOSE BLD-MCNC: 97 MG/DL (ref 70–99)
HBA1C MFR BLD: 5 % (ref ?–5.7)
HCT VFR BLD AUTO: 43 %
HDLC SERPL-MCNC: 95 MG/DL (ref 40–59)
HGB BLD-MCNC: 14.6 G/DL
IMM GRANULOCYTES # BLD AUTO: 0.01 X10(3) UL (ref 0–1)
IMM GRANULOCYTES NFR BLD: 0.2 %
LDLC SERPL CALC-MCNC: 72 MG/DL (ref ?–100)
LYMPHOCYTES # BLD AUTO: 1.78 X10(3) UL (ref 1–4)
LYMPHOCYTES NFR BLD AUTO: 27.2 %
MCH RBC QN AUTO: 33.6 PG (ref 26–34)
MCHC RBC AUTO-ENTMCNC: 34 G/DL (ref 31–37)
MCV RBC AUTO: 99.1 FL
MONOCYTES # BLD AUTO: 0.38 X10(3) UL (ref 0.1–1)
MONOCYTES NFR BLD AUTO: 5.8 %
NEUTROPHILS # BLD AUTO: 4.23 X10 (3) UL (ref 1.5–7.7)
NEUTROPHILS # BLD AUTO: 4.23 X10(3) UL (ref 1.5–7.7)
NEUTROPHILS NFR BLD AUTO: 64.6 %
NONHDLC SERPL-MCNC: 88 MG/DL (ref ?–130)
OSMOLALITY SERPL CALC.SUM OF ELEC: 288 MOSM/KG (ref 275–295)
PLATELET # BLD AUTO: 291 10(3)UL (ref 150–450)
POTASSIUM SERPL-SCNC: 4.2 MMOL/L (ref 3.5–5.1)
PROT SERPL-MCNC: 8.3 G/DL (ref 6.4–8.2)
RBC # BLD AUTO: 4.34 X10(6)UL
SODIUM SERPL-SCNC: 139 MMOL/L (ref 136–145)
TRIGL SERPL-MCNC: 91 MG/DL (ref 30–149)
VIT D+METAB SERPL-MCNC: 43.5 NG/ML (ref 30–100)
VLDLC SERPL CALC-MCNC: 14 MG/DL (ref 0–30)
WBC # BLD AUTO: 6.5 X10(3) UL (ref 4–11)

## 2023-10-24 PROCEDURE — 80053 COMPREHEN METABOLIC PANEL: CPT

## 2023-10-24 PROCEDURE — 99211 OFF/OP EST MAY X REQ PHY/QHP: CPT

## 2023-10-24 PROCEDURE — 85025 COMPLETE CBC W/AUTO DIFF WBC: CPT

## 2023-10-24 PROCEDURE — 36415 COLL VENOUS BLD VENIPUNCTURE: CPT

## 2023-10-24 PROCEDURE — 80061 LIPID PANEL: CPT

## 2023-10-24 PROCEDURE — 83036 HEMOGLOBIN GLYCOSYLATED A1C: CPT

## 2023-10-24 PROCEDURE — 82306 VITAMIN D 25 HYDROXY: CPT

## 2023-10-24 NOTE — DISCHARGE INSTRUCTIONS
POST PROCEDURE CARE AND INFECTION PREVENTION:    1. Your dressing should be removed within 24 hours. If it falls off before that time, you need not reapply. 2.   You may shower tomorrow. 3.   If necessary, you may apply ice to the injection site for 20 minute intervals to help alleviate any localized discomfort. 4.  The [facility] office number is [phone]. Call and report the following conditions to the [facility]:   -Any excessive bleeding, swelling, or pain at the injection site.   -Any temperature greater than 101 degrees.   -Any excessive itch or rash. -Any change in your bowel or bladder habits.   -Any increased numbness, tingling, or weakness to the extremities. -Any persistent, severe headache (especially a headache aggravated by being in   An upright position. The office is open between the hours of 7:30 am - 2:00pm.  After hours you may leave a message and the nurse will return your call during normal office hours. 5.  Please call the [facility] in one week to schedule an appointment for your follow up visit unless instructed differently by your doctor. If you need to cancel or reschedule any appointment, please call as soon as possible. 6.  You may return to school or work per your doctor's instructions. 7.  Wash your hands before and after touching your dressing. Be sure to rub your hands together with warm water and soap for 20 seconds or longer when washing. MEDICATION:    1. You may resume all your usual medications unless instructed otherwise by your doctor. 2.  To alleviate pain, you may take your over the counter or prescription pain medications as per the label instructions. 3.  Se Medication Reconciliation form for any new prescriptions. 4.  Do not drive, operate heavy machinery, or drink alcoholic beverages while taking narcotic pain medication. Narcotic pain medication may cause constipation. If no bowel movement in 48 hours, please contact your physician. IN CASE OF EMERGENCY:  If you are unable to reach your doctor, call or go to the nearest emergency room. The [facility] Emergency Department's phone number is [phone]. INSTRUCTIONS FOR PATIENT WITH GENERAL/IV SEDATION ONLY:  1. Begin clear liquids and bland foods then progress to your normal diet if you are not nauseated. 2.  Nausea and vomiting occasionally occur after surgery. If you are nauseated, remain on clear liquids until it passes. If nausea persists longer than 24 hours, please notify your doctor. 3.  Rest on the day of surgery. You may increase activity as tolerated starting tomorrow. 4. Do not drive, operate hazardous machinery, or make important personal or legal decisions for 24 hours. 5.  You may feel dizzy or lightheaded from anesthesia. Move cautiously for 24 hours.

## 2023-10-24 NOTE — CHRONIC PAIN
[address] [place] [address] for Pain Management  Follow Up Visit         History of Present Illness:    Morales Marcos is a 50year old female, referred to the pain clinic for right upper quadrant abdominal pain, which began soon after cholecystectomy surgery. The patient underwent previously TAP block on the Right side which provided 100% pain relief for about 3 months. The pain started returning recently and the patient wishes to have the block repeated today. The pain is unchanged in terms of the location and character. There is no new symptoms associated with it suggesting intraabdominal process. The patient also complains of pain around and at her tailbone, which is chronic pain related to past injury/fall. The pain has been bothering her for a long time and failed to get better/resolve with medications and exercise. There was no injection done for this pain and she is interested in attempting one. Blood Thinning Medications:  None    Current Medications:  Current Outpatient Medications   Medication Sig Dispense Refill    Tirzepatide (MOUNJARO) 7.5 MG/0.5ML Subcutaneous Solution Pen-injector Inject 7.5 mg into the skin once a week. 6 mL 0    lisdexamfetamine (VYVANSE) 20 MG Oral Cap Take 1 capsule (20 mg total) by mouth every morning. 30 capsule 0    sertraline 50 MG Oral Tab Take 1 tablet (50 mg total) by mouth every evening.  90 tablet 3        Allergies:  No Known Allergies     Review of Systems:  Bowel/Bladder Incontinence:  As above  Numbness/tingling:  As above  Weakness:  As above  Weight Loss:  Negative  Fever:  Negative  Cardiovascular:  No current chest pain or palpitations  Respiratory:  No current shortness of breath  Gastrointestinal:  No active ulcer  Genitourinary:  Negative  Integumentary:  Negative  Psychiatric:  Negative  Hematologic:  No active bleeding  Lymphatic:  No current lymphedema  Allergic/Immunologic:  Negative  Musculoskeletal:  As above  Neurological:  As above  Denies chest pain, shortness of breath. Medical History:  Patient Active Problem List:     Anxiety     right C6 radiculopathy     Right lateral epicondylitis     Chronic pain of right thumb     Abdominal pain     Abdominal bloating     Chronic constipation     Overweight (BMI 25.0-29. 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     Coccygeal pain       Past Medical History:   Diagnosis Date    Allergic rhinitis 8    Anxiety High school    Esophageal reflux Pregnancy    Shingles 2019       Surgical History:  Past Surgical History:   Procedure Laterality Date    CHOLECYSTECTOMY  2021    COLONOSCOPY      Normal    COLONOSCOPY  2021    Normal     COLONOSCOPY      [facility]      08, 1/19/10, 3/1/13        Family History:   Family History   Problem Relation Age of Onset    Cancer Father         Lung cancer    Colon Cancer Maternal Grandfather         Social History:  Social History    Socioeconomic History      Marital status:       Spouse name: Not on file      Number of children: Not on file      Years of education: Not on file      Highest education level: Not on file    Occupational History      Not on file    Tobacco Use      Smoking status: Never      Smokeless tobacco: Never    Vaping Use      Vaping Use: Never used    Substance and Sexual Activity      Alcohol use: Yes        Alcohol/week: 1.0 standard drink of alcohol        Types: 1 Glasses of wine per week        Comment: socailly      Drug use: Never      Sexual activity: Yes        Partners: Male    Other Topics      Concerns:        Caffeine Concern: Yes        Exercise: Yes        Seat Belt: Yes        Special Diet: No        Stress Concern: No        Weight Concern: Yes          I lost 30 lbs.  then gained back 20         Service: Not Asked        Blood Transfusions: Not Asked        Occupational Exposure: Not Asked        Hobby Hazards: Not Asked        Sleep Concern: Not Asked        Back Care: Not Asked        Bike Helmet: Not Asked        Self-Exams: Not Asked    Social History Narrative      The patient does not use an assistive device. .        The patient does live in a home with stairs. Social Determinants of Health  Financial Resource Strain: Not on file  Food Insecurity: Not on file  Transportation Needs: Not on file  Physical Activity: Not on file  Stress: Not on file  Social Connections: Not on file  Housing Stability: Not on file     Physical Examination:   10/24/23  1057   BP: 115/80   Pulse: 58        General:  Alert and oriented x3  Affect:  NAD  Head:  Normocephalic, atraumatic  Eyes:  anicteric; no injection  CV:  Reg rate  Respiratory:  Non labored breathing on room air   Gait: non antalgic, cane user:  no  Spine:  Normal   Lumbar ROM: normal   Abdomen: soft; non distended; non rigid; active bowel sounds; tenderness to palpation present at the right upper quadrant right under the costal margin and right lower lateral ribcage  Carnett's test: positive for RUQ pain when abdomen flexed and when supine with legs elevated  Neurologic: normal motor strength and intact sensation upper and lower extremities b/l          IMAGING:  PROCEDURE: US ABDOMEN LIMITED (CPT=76705)       COMPARISON:   None. INDICATIONS:   G89.29 Chronic RLQ pain R10.31 Chronic RLQ pain R14.0 Abdominal bloating       TECHNIQUE:   Limited evaluation of the areas indicated in the order with gray scale and colorflow of the main vessels where appropriate. Areas scanned:  Liver gallbladder pancreas and right kidney       FINDINGS:   LIVER:   Normal size, echotexture and color flow. No masses or duct dilatation. Color flow and Doppler imaging of the portal and hepatic veins show patency and antegrade flow. GALLBLADDER:   Cholecystectomy   BILE DUCTS:   Normal.  Common bile duct measures 2.9 mm. PANCREAS:   Visualized portions are unremarkable. Distal pancreatic tail obscured   OTHER:   No free fluid hepatorenal fossa. No hydronephrosis right kidney. CONCLUSION:   1. Normal hepatic sonogram.  No biliary dilatation. 2. Cholecystectomy. Normal caliber common duct. 3. No free fluid hepatorenal fossa. No hydronephrosis right kidney. 4. Visualized portions the pancreas unremarkable. Distal pancreatic tail obscured               DICTATED BY (CST): Chad Meneses MD ON 2022 AT 11:59 AM       FINALIZED BY (CST): Chad Meneses MD ON 2022 AT 12:01 PM                ASSESSMENT AND PLAN:   50year old old female with right upper quadrant abdominal pain due to anterior cutaneous nerve entrapment (ACNES) s/p TAP block with 100% relief lasting for ~ 3 months. Repeat TAP block under US guidance today  in the office with good outcome tolerated well. Also complaint of tailbone pain due to coccydynia, awaiting ganglion impar block which postponed until Dec 2023. Pt will return to clinic for follow up 2 weeks after the block      Time spent: 28 minutes         PROCEDURE NOTE           Date of service: 10/24/2023    : 11/15/1975    Procedure: Right sided transversus abdominis plane block     Pre-procedure diagnosis: abdominal pain; abdominal cutaneous nerve entrapment     Post-procedure diagnosis: abdominal pain; abdominal cutaneous nerve entrapment     Surgeon: Calvin Torres DO       Procedure: The patient was position supine on the procedure table. The area of injection was prepped with chlorhexidine and draped with sterile towels sterile manner. The procedure was performed aseptically. A linear ultrasound transducer was covered with sterile covering and was used in the transverse axis visualize the three lateral abdominal wall muscle layers. The transducer was placed in the axial plane on the midaxillary line between the subcostal margin and the iliac crest and the three layers of abdominal wall muscles were visualized: external and internal oblique as well as the transversus abdominis muscles. Skin and tissue was infiltrated at the needle placement site. Next, 100mm long echogenic insulated needle was introduced into the skin and the needle tip was advanced under ultrasound guidance until reached the final target location, the fascial plane between the internal oblique and the transversus abdominis muscles. After negative aspiration for blood, fluid and air, 10ml of injectate consisting of 40mg methylprednisolone mixed with 0.25% bupivacaine was injected incrementally. The needle was removed and bandaid was placed over the needle entry points. There was no significant pain or paresthesias during the procedure. There were no signs of local anesthetic toxicity. There were no other complications observed or reported. The patient tolerated procedure well. The patient was monitored for 10 minutes after the block and was discharged home in stable ambulatory condition. The patient reported complete pain relief from the block before leaving the office. The follow up visit is scheduled in 2 weeks.        Mitul Anne DO  Anesthesiology  Pain Medicine

## 2023-10-24 NOTE — PROGRESS NOTES
PT presents ambulatory to the C[facility].  Pt reports no changes with 6/10 pain today. Dr. Marcus Brown saw PT for in office US Guided Procedure. See notes for POC.

## 2023-10-30 RX ORDER — BUPIVACAINE HYDROCHLORIDE 2.5 MG/ML
10 INJECTION, SOLUTION EPIDURAL; INFILTRATION; INTRACAUDAL ONCE
Status: ACTIVE | OUTPATIENT
Start: 2023-10-30

## 2023-10-30 RX ORDER — METHYLPREDNISOLONE ACETATE 40 MG/ML
40 INJECTION, SUSPENSION INTRA-ARTICULAR; INTRALESIONAL; INTRAMUSCULAR; SOFT TISSUE ONCE
Status: ACTIVE | OUTPATIENT
Start: 2023-10-30

## 2023-10-31 DIAGNOSIS — F90.0 ATTENTION DEFICIT HYPERACTIVITY DISORDER (ADHD), PREDOMINANTLY INATTENTIVE TYPE: ICD-10-CM

## 2023-10-31 DIAGNOSIS — E66.3 OVERWEIGHT (BMI 25.0-29.9): ICD-10-CM

## 2023-11-02 RX ORDER — LISDEXAMFETAMINE DIMESYLATE CAPSULES 20 MG/1
20 CAPSULE ORAL EVERY MORNING
Qty: 30 CAPSULE | Refills: 0 | Status: SHIPPED | OUTPATIENT
Start: 2023-11-02

## 2023-11-02 NOTE — TELEPHONE ENCOUNTER
Please review; protocol failed. Requested Prescriptions   Pending Prescriptions Disp Refills    lisdexamfetamine (VYVANSE) 20 MG Oral Cap 30 capsule 0     Sig: Take 1 capsule (20 mg total) by mouth every morning.        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    [facility] Li Chambers, DO    Office Visit    1 month ago     [facility] Li Chambers, DO    Office Visit    1 month ago Encounter for routine adult health examination without abnormal findings    [address] [facility] [place], [place]    Office Visit    5 months ago Right upper quadrant abdominal pain    [facility] Li Chambers, DO    Office Visit    6 months ago Right upper quadrant abdominal pain    [facility]    Office Visit          Future Appointments         Provider Department Appt Notes    In 1 month Li Chambers, [address] for [facility] procedure f/u: Dx Ganglion Impar Blk 11/28/23    In 1 month [facility]; [address] no dexa prior 2 yrs ds  Bone density

## 2023-11-03 ENCOUNTER — NURSE TRIAGE (OUTPATIENT)
Facility: CLINIC | Age: 48
End: 2023-11-03

## 2023-11-03 NOTE — TELEPHONE ENCOUNTER
Action Requested: Summary for Provider     []  Critical Lab, Recommendations Needed  [] Need Additional Advice  []   FYI    []   Need Orders  [] Need Medications Sent to Pharmacy  []  Other     SUMMARY: Per protocol disposition advised office visit today or tomorrow. Patient is in another state and will go to local [facility]/walk in clinic      Reason for call: Ear Pain  Onset: 1 day     Right ear popped during descent on airplane: since then patient reports inner and outer ear pain, states \"it feels wet inside. \" \"Feels heavy,\" mild ringing and hearing is muffled. Rating right ear pain as \"6.5/10\" states yesterday was 9/10. Left ear has mild pain. Denies other symptoms marked no under protocol.      Reason for Disposition   All other earaches  (Exceptions: Earache lasting < 1 hour, and earache from air travel.)    Protocols used: Marko Muller

## 2023-11-06 ENCOUNTER — PATIENT MESSAGE (OUTPATIENT)
Facility: CLINIC | Age: 48
End: 2023-11-06

## 2023-11-06 NOTE — TELEPHONE ENCOUNTER
Verified name and . Patient states was diagnosed with ear infection at ER in [place]. She states she was prescribed antibiotics and has been taking the with no relief.     ER follow up scheduled:      Future Appointments   Date Time Provider Shawanda Burnsi   2023  1:30 PM Rodriguez Coats MD [facility] 14 FP [facility] 10 OP   2023  7:30 AM Melissa Jimenez DO [facility]   2023  2:40 PM [facility] DEXA [facility]1 [facility] D[facility]A EM [place]

## 2023-11-07 ENCOUNTER — OFFICE VISIT (OUTPATIENT)
Facility: CLINIC | Age: 48
End: 2023-11-07
Payer: COMMERCIAL

## 2023-11-07 VITALS
OXYGEN SATURATION: 98 % | HEART RATE: 65 BPM | BODY MASS INDEX: 25 KG/M2 | DIASTOLIC BLOOD PRESSURE: 88 MMHG | TEMPERATURE: 98 F | WEIGHT: 156.5 LBS | SYSTOLIC BLOOD PRESSURE: 140 MMHG

## 2023-11-07 DIAGNOSIS — H60.391 OTHER INFECTIVE ACUTE OTITIS EXTERNA OF RIGHT EAR: Primary | ICD-10-CM

## 2023-11-07 DIAGNOSIS — H65.02 NON-RECURRENT ACUTE SEROUS OTITIS MEDIA OF LEFT EAR: ICD-10-CM

## 2023-11-07 DIAGNOSIS — Z28.20 IMMUNIZATION NOT CARRIED OUT BECAUSE OF PATIENT DECISION: ICD-10-CM

## 2023-11-07 PROCEDURE — 3079F DIAST BP 80-89 MM HG: CPT | Performed by: FAMILY MEDICINE

## 2023-11-07 PROCEDURE — 3077F SYST BP >= 140 MM HG: CPT | Performed by: FAMILY MEDICINE

## 2023-11-07 PROCEDURE — 99213 OFFICE O/P EST LOW 20 MIN: CPT | Performed by: FAMILY MEDICINE

## 2023-11-07 RX ORDER — AMOXICILLIN AND CLAVULANATE POTASSIUM 875; 125 MG/1; MG/1
1 TABLET, FILM COATED ORAL 2 TIMES DAILY
COMMUNITY
Start: 2023-11-03

## 2023-11-07 RX ORDER — CIPROFLOXACIN AND DEXAMETHASONE 3; 1 MG/ML; MG/ML
4 SUSPENSION/ DROPS AURICULAR (OTIC) 2 TIMES DAILY
Qty: 7.5 ML | Refills: 0 | Status: SHIPPED | OUTPATIENT
Start: 2023-11-07 | End: 2023-11-14

## 2023-11-07 RX ORDER — CLOTRIMAZOLE AND BETAMETHASONE DIPROPIONATE 10; .64 MG/G; MG/G
1 CREAM TOPICAL 2 TIMES DAILY
COMMUNITY
Start: 2023-10-27

## 2023-11-10 ENCOUNTER — OFFICE VISIT (OUTPATIENT)
Dept: OTOLARYNGOLOGY | Facility: CLINIC | Age: 48
End: 2023-11-10
Payer: COMMERCIAL

## 2023-11-10 VITALS — BODY MASS INDEX: 23.54 KG/M2 | WEIGHT: 150 LBS | HEIGHT: 67 IN

## 2023-11-10 DIAGNOSIS — H69.90 EUSTACHIAN TUBE DISORDER, UNSPECIFIED LATERALITY: ICD-10-CM

## 2023-11-10 DIAGNOSIS — H93.8X1 SENSATION OF FULLNESS IN RIGHT EAR: ICD-10-CM

## 2023-11-10 DIAGNOSIS — M26.609 TMJ (TEMPOROMANDIBULAR JOINT DISORDER): Primary | ICD-10-CM

## 2023-11-10 RX ORDER — MELOXICAM 15 MG/1
15 TABLET ORAL NIGHTLY
Qty: 30 TABLET | Refills: 0 | Status: SHIPPED | OUTPATIENT
Start: 2023-11-10 | End: 2023-12-10

## 2023-11-10 RX ORDER — PSEUDOEPHEDRINE HCL 120 MG/1
120 TABLET, FILM COATED, EXTENDED RELEASE ORAL EVERY 12 HOURS
Qty: 42 TABLET | Refills: 0 | Status: SHIPPED | OUTPATIENT
Start: 2023-11-10 | End: 2023-12-01

## 2023-11-10 RX ORDER — CYCLOBENZAPRINE HCL 5 MG
5 TABLET ORAL NIGHTLY
Qty: 30 TABLET | Refills: 0 | Status: SHIPPED | OUTPATIENT
Start: 2023-11-10 | End: 2023-12-10

## 2023-11-28 ENCOUNTER — APPOINTMENT (OUTPATIENT)
Dept: GENERAL RADIOLOGY | Facility: HOSPITAL | Age: 48
End: 2023-11-28
Attending: ANESTHESIOLOGY
Payer: COMMERCIAL

## 2023-11-28 ENCOUNTER — HOSPITAL ENCOUNTER (OUTPATIENT)
Facility: HOSPITAL | Age: 48
Setting detail: HOSPITAL OUTPATIENT SURGERY
Discharge: HOME | End: 2023-11-28
Attending: ANESTHESIOLOGY | Admitting: ANESTHESIOLOGY
Payer: COMMERCIAL

## 2023-11-28 VITALS
TEMPERATURE: 99 F | SYSTOLIC BLOOD PRESSURE: 121 MMHG | BODY MASS INDEX: 24.48 KG/M2 | DIASTOLIC BLOOD PRESSURE: 80 MMHG | HEART RATE: 77 BPM | WEIGHT: 156 LBS | RESPIRATION RATE: 15 BRPM | OXYGEN SATURATION: 100 % | HEIGHT: 67 IN

## 2023-11-28 PROCEDURE — 3E0T3BZ INTRODUCTION OF ANESTHETIC AGENT INTO PERIPHERAL NERVES AND PLEXI, PERCUTANEOUS APPROACH: ICD-10-PCS | Performed by: ANESTHESIOLOGY

## 2023-11-28 PROCEDURE — 3E0T33Z INTRODUCTION OF ANTI-INFLAMMATORY INTO PERIPHERAL NERVES AND PLEXI, PERCUTANEOUS APPROACH: ICD-10-PCS | Performed by: ANESTHESIOLOGY

## 2023-11-28 RX ORDER — LIDOCAINE HYDROCHLORIDE 10 MG/ML
INJECTION, SOLUTION EPIDURAL; INFILTRATION; INTRACAUDAL; PERINEURAL AS NEEDED
Status: DISCONTINUED | OUTPATIENT
Start: 2023-11-28 | End: 2023-11-28 | Stop reason: HOSPADM

## 2023-11-28 RX ORDER — METHYLPREDNISOLONE ACETATE 40 MG/ML
INJECTION, SUSPENSION INTRA-ARTICULAR; INTRALESIONAL; INTRAMUSCULAR; SOFT TISSUE AS NEEDED
Status: DISCONTINUED | OUTPATIENT
Start: 2023-11-28 | End: 2023-11-28 | Stop reason: HOSPADM

## 2023-11-28 RX ORDER — IOPAMIDOL 408 MG/ML
INJECTION, SOLUTION INTRATHECAL AS NEEDED
Status: DISCONTINUED | OUTPATIENT
Start: 2023-11-28 | End: 2023-11-28 | Stop reason: HOSPADM

## 2023-11-28 RX ORDER — BUPIVACAINE HYDROCHLORIDE 2.5 MG/ML
INJECTION, SOLUTION EPIDURAL; INFILTRATION; INTRACAUDAL AS NEEDED
Status: DISCONTINUED | OUTPATIENT
Start: 2023-11-28 | End: 2023-11-28 | Stop reason: HOSPADM

## 2023-12-02 NOTE — BRIEF OP NOTE
OPERATIVE NOTE       Date of Service: 11/28/2023    YOB: 1975    Procedure Name: Ganglion impar injection with fluoroscopic needle guidance    Pre-procedure diagnosis: coccygeal pain     Post-procedure diagnosis: coccygeal pain     Surgeon: Ramon Batista DO     Anesthesia: local     Complications: none     Indication: 46year old female with hx of remote trauma to the tailbone who presented to the Pain Clinic with complaint of pain at and around the coccyx, which failed to respond to conservative treatments including pain medications. She was recommended the above injection in hopes of relieving the pain and restoring normal function. She desires to proceed with the injection as scheduled. The patient was informed of potential adverse events related to the injection, benefits of the treatment and alternative treatments. She expressed understanding and accepting these risks. She gave permission to proceed with the scheduled injection and signed informed written consent. TECHNIQUE: Time-out was taken to identify the correct patient, procedure and side prior to starting the procedure. The patient was placed in prone position on the table with pillow under the pelvis. The procedure area was widely prepped with chlorhexidine and draped with sterile towels in the usual sterile fashion. Standard anesthesia monitors were connected. The patient remained fully awake and responsive throughout the procedure. Lateral view was used to identify the landmarks; needle entry site was marked and overlying skin and tissue was infiltrated with 1% lidocaine. Next, 20-gauge quincke needle was introduced through the sacrococcygeal ligament and was advanced anteriorly to reach the anterior edge of the sacrum. When in the appropriate location, 4cc of Omnipaque 240 was injected under live fluoroscopy to confirm the correct final needle placement and exclude vascular runoff.  Dye spread was visualized along the anterior edge of the sacrum. Proper spread was confirmed with the AP fluoroscopic view. Then, injectate consisting of 40mg methylprednisolone mixed with 9 mL of 0.25% bupivacaine was injected after negative aspiration for fluid, blood and air. The procedure was completed without complications and was tolerated well. The patient was monitored after the procedure in recovery. Neurologic exam was intact and unchanged from the preop. The patient reported improvement of her pain In recovery. She was given post-procedure and discharge instructions to follow at home. The patient was discharged in stable condition. A follow-up appointment was made in 2 weeks.        Kleber Kan DO  Anesthesiology  Pain Medicine

## 2023-12-09 DIAGNOSIS — E66.3 OVERWEIGHT (BMI 25.0-29.9): ICD-10-CM

## 2023-12-09 DIAGNOSIS — F90.0 ATTENTION DEFICIT HYPERACTIVITY DISORDER (ADHD), PREDOMINANTLY INATTENTIVE TYPE: ICD-10-CM

## 2023-12-09 NOTE — TELEPHONE ENCOUNTER
This refill request is being sent to the provider for the following reason:  []Patient has not had an appointment within the past 12 months but has made an appointment on: ___  []Medication is not within protocol  [x]Patient did not complete follow up recommendations  []Other: ___

## 2023-12-11 RX ORDER — LISDEXAMFETAMINE DIMESYLATE CAPSULES 30 MG/1
30 CAPSULE ORAL EVERY MORNING
Qty: 30 CAPSULE | Refills: 0 | Status: SHIPPED | OUTPATIENT
Start: 2023-12-11

## 2023-12-11 NOTE — TELEPHONE ENCOUNTER
Please review; protocol failed. Please see patients MyChart Message     Susan CASTILLO Em Triage Support2 days ago     NP  Refills have been requested for the following medications:         lisdexamfetamine (VYVANSE) 20 MG Oral Cap [Lisa Reeves]      Patient Comment: Can I get a refill with 30mg  Requested Prescriptions   Pending Prescriptions Disp Refills    lisdexamfetamine (VYVANSE) 20 MG Oral Cap 30 capsule 0     Sig: Take 1 capsule (20 mg total) by mouth every morning.        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    [facility] Kelsey Ponce DO    Office Visit    2 months ago 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

## 2023-12-23 ENCOUNTER — HOSPITAL ENCOUNTER (OUTPATIENT)
Dept: BONE DENSITY | Age: 48
Discharge: HOME | End: 2023-12-23
Attending: FAMILY MEDICINE
Payer: COMMERCIAL

## 2023-12-23 DIAGNOSIS — Z13.820 SCREENING FOR OSTEOPOROSIS: ICD-10-CM

## 2023-12-23 PROCEDURE — 77080 DXA BONE DENSITY AXIAL: CPT | Performed by: FAMILY MEDICINE

## 2023-12-27 ENCOUNTER — TELEPHONE (OUTPATIENT)
Facility: CLINIC | Age: 48
End: 2023-12-27

## 2023-12-27 DIAGNOSIS — E88.819 INSULIN RESISTANCE: Primary | ICD-10-CM

## 2023-12-27 DIAGNOSIS — E66.3 OVERWEIGHT (BMI 25.0-29.9): ICD-10-CM

## 2023-12-27 RX ORDER — TIRZEPATIDE 7.5 MG/.5ML
7.5 INJECTION, SOLUTION SUBCUTANEOUS WEEKLY
Qty: 2 ML | Refills: 0 | Status: SHIPPED | OUTPATIENT
Start: 2023-12-27

## 2023-12-27 RX ORDER — TIRZEPATIDE 7.5 MG/.5ML
7.5 INJECTION, SOLUTION SUBCUTANEOUS WEEKLY
Qty: 6 ML | Refills: 0 | Status: SHIPPED | OUTPATIENT
Start: 2023-12-27 | End: 2023-12-27

## 2023-12-27 NOTE — TELEPHONE ENCOUNTER
Called pharmacy spoke with Marcia Escalante  said Uli Ceja not cover by insurance, no option for prior auth given

## 2023-12-27 NOTE — TELEPHONE ENCOUNTER
Ihsan Sarkar is FDA approved for weight loss, but works exactly the same as Mounjaro. I will send it at the same dose.

## 2023-12-27 NOTE — TELEPHONE ENCOUNTER
Patient was aware,said not sure why pharmacy contacted us. Has been using coupons for [facility].

## 2023-12-27 NOTE — TELEPHONE ENCOUNTER
Please recommend she look into Zepbound as I know [employer] is not covered unless she has Type 2 diabetes.

## 2023-12-27 NOTE — TELEPHONE ENCOUNTER
Received for prior auth new script needed if patient to continue  Please return to triage after Rx sent for prior auth        CoverMyMeds    Key:[id number]    MOUNJARO 7.5 MG

## 2023-12-27 NOTE — TELEPHONE ENCOUNTER
Received notice:  It has been determined that request may not meet criteria for approval.      If prescriber would like to discuss this with a physician priro to the final determination, please contact [facility] Keshav prior auth at [phone]  Member CECILY#[id number]

## 2023-12-27 NOTE — TELEPHONE ENCOUNTER
Please notify the patient that the medications are not covered. She is welcome to schedule a follow-up visit with me if she should like.

## 2024-01-02 ENCOUNTER — PATIENT MESSAGE (OUTPATIENT)
Facility: CLINIC | Age: 49
End: 2024-01-02

## 2024-01-02 DIAGNOSIS — E66.3 OVERWEIGHT (BMI 25.0-29.9): Primary | ICD-10-CM

## 2024-01-02 DIAGNOSIS — F41.9 ANXIETY: ICD-10-CM

## 2024-01-05 NOTE — TELEPHONE ENCOUNTER
Please advise     LOV 10/2023     Will continue Mounjaro for menopausal induced insulin resistance but increase dose to 7.5 mg weekly as she is not feeling as many effects anymore. Will also add Vyvanse 20 mg daily given concurrent ADHD and difficulty with focus despite well controlled anxiety on Sertraline 50 mg daily.   Check vitamin d level and also DEXA for osteoporosis screening given postmenopausal statu

## 2024-01-05 NOTE — TELEPHONE ENCOUNTER
From: Tyesha Chowdhury  Sent: 1/3/2024 8:51 PM CST  To: Em Triage Support  Subject: Weight Gain    I’ve been off for a few months, but I have some 7.5 left and a prescription for 7.5. Can I start back with the 7.5 or switch to a different medicine? Or just get a lower dose next month?

## 2024-01-08 RX ORDER — TIRZEPATIDE 2.5 MG/.5ML
2.5 INJECTION, SOLUTION SUBCUTANEOUS WEEKLY
Qty: 2 ML | Refills: 0 | Status: SHIPPED | OUTPATIENT
Start: 2024-01-08

## 2024-01-08 NOTE — TELEPHONE ENCOUNTER
Patient called, verified Name and . Following up on prescription for Zepbound sent on  for 7.5 mg weekly.    Dr. Reeves please advise. Per your last Be Spotted message to the patient, you recommended for her to be started at the lowest dose. Patient wants to know if that would be 2.5 mg or 5 mg?

## 2024-01-29 DIAGNOSIS — E66.3 OVERWEIGHT (BMI 25.0-29.9): ICD-10-CM

## 2024-01-29 DIAGNOSIS — F41.9 ANXIETY: ICD-10-CM

## 2024-01-29 DIAGNOSIS — F90.0 ATTENTION DEFICIT HYPERACTIVITY DISORDER (ADHD), PREDOMINANTLY INATTENTIVE TYPE: ICD-10-CM

## 2024-01-29 RX ORDER — TIRZEPATIDE 2.5 MG/.5ML
2.5 INJECTION, SOLUTION SUBCUTANEOUS WEEKLY
Qty: 2 ML | Refills: 0 | Status: CANCELLED | OUTPATIENT
Start: 2024-01-29

## 2024-01-31 RX ORDER — LISDEXAMFETAMINE DIMESYLATE CAPSULES 30 MG/1
30 CAPSULE ORAL EVERY MORNING
Qty: 30 CAPSULE | Refills: 0 | Status: SHIPPED | OUTPATIENT
Start: 2024-01-31

## 2024-01-31 RX ORDER — TIRZEPATIDE 5 MG/.5ML
5 INJECTION, SOLUTION SUBCUTANEOUS WEEKLY
Qty: 2 ML | Refills: 0 | Status: SHIPPED | OUTPATIENT
Start: 2024-01-31

## 2024-01-31 NOTE — TELEPHONE ENCOUNTER
Please review. Protocol Failed or has No Protocol. Pt requesting to increase dose.    Requested Prescriptions   Pending Prescriptions Disp Refills    Tirzepatide-Weight Management (ZEPBOUND) 2.5 MG/0.5ML Subcutaneous Solution Auto-injector 2 mL 0     Sig: Inject 2.5 mg into the skin once a week.       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

## 2024-01-31 NOTE — TELEPHONE ENCOUNTER
Please review. Protocol failed or has no protocol.    Requested Prescriptions   Pending Prescriptions Disp Refills    lisdexamfetamine (VYVANSE) 30 MG Oral Cap 30 capsule 0     Sig: Take 1 capsule (30 mg total) by mouth every morning.       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

## 2024-03-05 ENCOUNTER — TELEPHONE (OUTPATIENT)
Facility: CLINIC | Age: 49
End: 2024-03-05

## 2024-03-05 DIAGNOSIS — F41.9 ANXIETY: ICD-10-CM

## 2024-03-05 DIAGNOSIS — E66.3 OVERWEIGHT (BMI 25.0-29.9): Primary | ICD-10-CM

## 2024-03-05 RX ORDER — TIRZEPATIDE 7.5 MG/.5ML
7.5 INJECTION, SOLUTION SUBCUTANEOUS WEEKLY
Qty: 2 ML | Refills: 0 | Status: SHIPPED | OUTPATIENT
Start: 2024-03-05 | End: 2024-03-05

## 2024-03-05 RX ORDER — TIRZEPATIDE 7.5 MG/.5ML
7.5 INJECTION, SOLUTION SUBCUTANEOUS WEEKLY
Qty: 2 ML | Refills: 0 | Status: SHIPPED | OUTPATIENT
Start: 2024-03-05

## 2024-03-05 NOTE — TELEPHONE ENCOUNTER
Patient called back     Asking to send script to [facility] instead    She has not set-up the account yet with [facility]

## 2024-03-05 NOTE — TELEPHONE ENCOUNTER
Action Requested: Summary for Provider     []  Critical Lab, Recommendations Needed  [] Need Additional Advice  []   FYI    []   Need Orders  [x] Need Medications Sent to Pharmacy  []  Other     SUMMARY:  Asking for dose of Zepbound to be increased to   7.5 mg.    Advised appointment sent next available is not until June, but will schedule appointment    Asking if you knew anything about [facility]    Asking if you can send it to them to due to the shortage, thinks she can get it sooner.    IF not may just send it to [facility]    Reason for call: New Prescription  Onset: Data Unavailable

## 2024-03-10 RX ORDER — MELOXICAM 15 MG/1
15 TABLET ORAL NIGHTLY
Qty: 30 TABLET | Refills: 0 | OUTPATIENT
Start: 2024-03-10

## 2024-03-21 DIAGNOSIS — F90.0 ATTENTION DEFICIT HYPERACTIVITY DISORDER (ADHD), PREDOMINANTLY INATTENTIVE TYPE: ICD-10-CM

## 2024-03-22 RX ORDER — LISDEXAMFETAMINE DIMESYLATE CAPSULES 30 MG/1
30 CAPSULE ORAL EVERY MORNING
Qty: 30 CAPSULE | Refills: 0 | Status: SHIPPED | OUTPATIENT
Start: 2024-03-22

## 2024-03-22 NOTE — TELEPHONE ENCOUNTER
Please review.  Has no protocol.    Last office visit 11/7/2023.  Last refill 1/31/2024.    Requested Prescriptions   Pending Prescriptions Disp Refills    lisdexamfetamine (VYVANSE) 30 MG Oral Cap 30 capsule 0     Sig: Take 1 capsule (30 mg total) by mouth every morning.       Controlled Substance Medication Failed - 3/21/2024 12:13 PM        Failed - This medication is a controlled substance - forward to provider to refill           Future Appointments         Provider Department Appt Notes    In 2 months Lisa eReves DO [facility] Zepbound          Recent Outpatient Visits              4 months ago TMJ (temporomandibular joint disorder)    [facility]    Office Visit    4 months ago Other infective acute otitis externa of right ear    [facility] Makayla Hendrickson MD    Office Visit    5 months ago 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

## 2024-04-03 ENCOUNTER — TELEPHONE (OUTPATIENT)
Facility: CLINIC | Age: 49
End: 2024-04-03

## 2024-04-03 NOTE — TELEPHONE ENCOUNTER
Patient called (identified name and ),   Calling for refill of Zepbound 7,5 mg, but it has been difficult to locate.  She will call different pharmacies.  If she can't find 7.5 mg, asking if she can increase to 10 mg because she hasn't gotten optimal results from 7.5 mg.  Await her call back with pharmacy information.

## 2024-04-28 DIAGNOSIS — F41.9 ANXIETY: ICD-10-CM

## 2024-04-28 DIAGNOSIS — E66.3 OVERWEIGHT (BMI 25.0-29.9): ICD-10-CM

## 2024-04-30 RX ORDER — TIRZEPATIDE 10 MG/.5ML
10 INJECTION, SOLUTION SUBCUTANEOUS WEEKLY
Qty: 2 ML | Refills: 0 | OUTPATIENT
Start: 2024-04-30

## 2024-05-07 DIAGNOSIS — F41.9 ANXIETY: ICD-10-CM

## 2024-05-07 DIAGNOSIS — E66.3 OVERWEIGHT (BMI 25.0-29.9): ICD-10-CM

## 2024-05-07 RX ORDER — TIRZEPATIDE 10 MG/.5ML
10 INJECTION, SOLUTION SUBCUTANEOUS WEEKLY
Qty: 2 ML | Refills: 0 | Status: CANCELLED | OUTPATIENT
Start: 2024-05-07

## 2024-05-08 DIAGNOSIS — F90.0 ATTENTION DEFICIT HYPERACTIVITY DISORDER (ADHD), PREDOMINANTLY INATTENTIVE TYPE: ICD-10-CM

## 2024-05-08 RX ORDER — TIRZEPATIDE 10 MG/.5ML
INJECTION, SOLUTION SUBCUTANEOUS
Qty: 2 ML | Refills: 0 | OUTPATIENT
Start: 2024-05-08

## 2024-05-09 ENCOUNTER — TELEPHONE (OUTPATIENT)
Facility: CLINIC | Age: 49
End: 2024-05-09

## 2024-05-09 RX ORDER — LISDEXAMFETAMINE DIMESYLATE 30 MG/1
30 CAPSULE ORAL EVERY MORNING
Qty: 30 CAPSULE | Refills: 0 | Status: SHIPPED | OUTPATIENT
Start: 2024-05-09

## 2024-05-09 NOTE — TELEPHONE ENCOUNTER
Please review;  [facility] protocol failed/ No protocol     Requested Prescriptions   Pending Prescriptions Disp Refills    lisdexamfetamine (VYVANSE) 30 MG Oral Cap 30 capsule 0     Sig: Take 1 capsule (30 mg total) by mouth every morning.       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

## 2024-05-10 NOTE — TELEPHONE ENCOUNTER
Denied    Note from payer: Your PA request has been denied.  Additional information will be provided in the denial communication. (Message 1148)  Payer: [facility] [place] Case ID: [id number]    [phone]  Electronic appeal: Not supported  Appeal instructions: Your PA request has been denied.  Additional information will be provided in the denial communication. (Message 1147)  View History

## 2024-05-21 ENCOUNTER — TELEPHONE (OUTPATIENT)
Facility: CLINIC | Age: 49
End: 2024-05-21

## 2024-05-21 DIAGNOSIS — D64.9 ANEMIA, UNSPECIFIED TYPE: Primary | ICD-10-CM

## 2024-05-21 NOTE — TELEPHONE ENCOUNTER
Patient stated that she tried to donate blood today and she was inform her hemoglobin was 11.2 then they rechecked it and it was 11.5. Patient wanted to know if she should be concern. When she donated blood back on Aug 29,23 it was 15.2.  Our record indicate last hemoglobin in Oct 2023 it was 14.6. Patient has a appointment scheduled for 6/18/2024 if you feel she needs to be seen sooner please advise if ok to use your res 24 slot. Thank you      Future Appointments   Date Time Provider Department Center   6/18/2024  8:30 AM Lisa Reeves DO [facility]

## 2024-05-21 NOTE — TELEPHONE ENCOUNTER
The drop is likely from donating blood so I would not continue. We can repeat her levels at her upcoming visit. No need for sooner visit as long as she has no symptoms of anemia.

## 2024-05-21 NOTE — TELEPHONE ENCOUNTER
Relayed dr message, verbalized understanding  Stated she's been feeling tired for 2 months , asked if she was eating nutritional meal, stated she don't eat much food at all, ask about hydration-stated she don't drink much either but will start

## 2024-05-22 ENCOUNTER — LAB ENCOUNTER (OUTPATIENT)
Dept: LAB | Age: 49
End: 2024-05-22
Attending: FAMILY MEDICINE

## 2024-05-22 DIAGNOSIS — D64.9 ANEMIA, UNSPECIFIED TYPE: ICD-10-CM

## 2024-05-22 LAB
BASOPHILS # BLD AUTO: 0.04 X10(3) UL (ref 0–0.2)
BASOPHILS NFR BLD AUTO: 0.6 %
DEPRECATED HBV CORE AB SER IA-ACNC: 124.3 NG/ML
DEPRECATED RDW RBC AUTO: 40.2 FL (ref 35.1–46.3)
EOSINOPHIL # BLD AUTO: 0.11 X10(3) UL (ref 0–0.7)
EOSINOPHIL NFR BLD AUTO: 1.8 %
ERYTHROCYTE [DISTWIDTH] IN BLOOD BY AUTOMATED COUNT: 11.4 % (ref 11–15)
FOLATE SERPL-MCNC: >24 NG/ML (ref 5.4–?)
HCT VFR BLD AUTO: 34.1 %
HGB BLD-MCNC: 12.1 G/DL
IMM GRANULOCYTES # BLD AUTO: 0.02 X10(3) UL (ref 0–1)
IMM GRANULOCYTES NFR BLD: 0.3 %
IRON SATN MFR SERPL: 33 %
IRON SERPL-MCNC: 103 UG/DL
LYMPHOCYTES # BLD AUTO: 1.89 X10(3) UL (ref 1–4)
LYMPHOCYTES NFR BLD AUTO: 30.2 %
MCH RBC QN AUTO: 34.3 PG (ref 26–34)
MCHC RBC AUTO-ENTMCNC: 35.5 G/DL (ref 31–37)
MCV RBC AUTO: 96.6 FL
MONOCYTES # BLD AUTO: 0.49 X10(3) UL (ref 0.1–1)
MONOCYTES NFR BLD AUTO: 7.8 %
NEUTROPHILS # BLD AUTO: 3.7 X10 (3) UL (ref 1.5–7.7)
NEUTROPHILS # BLD AUTO: 3.7 X10(3) UL (ref 1.5–7.7)
NEUTROPHILS NFR BLD AUTO: 59.3 %
PLATELET # BLD AUTO: 239 10(3)UL (ref 150–450)
RBC # BLD AUTO: 3.53 X10(6)UL
TIBC SERPL-MCNC: 313 UG/DL (ref 250–425)
TRANSFERRIN SERPL-MCNC: 210 MG/DL (ref 250–380)
VIT B12 SERPL-MCNC: 740 PG/ML (ref 211–911)
WBC # BLD AUTO: 6.3 X10(3) UL (ref 4–11)

## 2024-05-22 PROCEDURE — 83540 ASSAY OF IRON: CPT

## 2024-05-22 PROCEDURE — 82746 ASSAY OF FOLIC ACID SERUM: CPT

## 2024-05-22 PROCEDURE — 84466 ASSAY OF TRANSFERRIN: CPT

## 2024-05-22 PROCEDURE — 82607 VITAMIN B-12: CPT

## 2024-05-22 PROCEDURE — 85025 COMPLETE CBC W/AUTO DIFF WBC: CPT

## 2024-05-22 PROCEDURE — 82728 ASSAY OF FERRITIN: CPT

## 2024-05-22 PROCEDURE — 36415 COLL VENOUS BLD VENIPUNCTURE: CPT

## 2024-05-22 NOTE — TELEPHONE ENCOUNTER
Spoke to patient, full name and date of birth verified. Informed the patient that Dr. Reeves has ordered the labs she requested. Patient verbalized understanding and stated she has no further questions.

## 2024-05-22 NOTE — TELEPHONE ENCOUNTER
RN reached out to patient. Patient's name and date of birth verified. Patient provided with recommendations per Dr. Reeves. Patient states her friend that is a nephrologist Samantha Majano recommended her to request the following lab orders before her upcomming visit.    ferritain, folic acid, B12, iron saturation test.     Future Appointments   Date Time Provider Department Center   6/18/2024  8:30 AM Lisa Reeves,  [facility]     Dr. Reeves,     Is this something you would like for her to complete before her upcoming visit?     Thank you.

## 2024-05-29 ENCOUNTER — TELEPHONE (OUTPATIENT)
Dept: PAIN CLINIC | Facility: HOSPITAL | Age: 49
End: 2024-05-29

## 2024-05-29 NOTE — TELEPHONE ENCOUNTER
Patient called to schedule an in office injection, for abdomen pain and tennis elbow, with Dr. Corey. Explained we don't have the schedule and patient would need an office visit before having the injections. Pt stated she's had injections in the office in the past without having a consult visit before. I explained I've been instructed to tell patients we are not able to have injections without the initial consult.  Pt asked for the insurance approvers phone number as she would like to speak with her to verify.  I gave her [phone] option 2.  Pt will call and speak with Latoya. Patient asked that I call her back and I explained I'm unable to take everyone's information to call at a later date, it would be too many. She will call in a week or two to schedule an appointment with Dr. Corey.

## 2024-06-04 DIAGNOSIS — E66.3 OVERWEIGHT (BMI 25.0-29.9): ICD-10-CM

## 2024-06-04 DIAGNOSIS — F41.9 ANXIETY: ICD-10-CM

## 2024-06-06 ENCOUNTER — OFFICE VISIT (OUTPATIENT)
Dept: PAIN CLINIC | Facility: HOSPITAL | Age: 49
End: 2024-06-06
Attending: ANESTHESIOLOGY
Payer: COMMERCIAL

## 2024-06-06 VITALS
BODY MASS INDEX: 24 KG/M2 | DIASTOLIC BLOOD PRESSURE: 70 MMHG | OXYGEN SATURATION: 98 % | HEART RATE: 82 BPM | SYSTOLIC BLOOD PRESSURE: 92 MMHG | WEIGHT: 156 LBS

## 2024-06-06 DIAGNOSIS — M25.521 ELBOW PAIN, RIGHT: ICD-10-CM

## 2024-06-06 DIAGNOSIS — M79.2 CHRONIC NEUROPATHIC PAIN: ICD-10-CM

## 2024-06-06 DIAGNOSIS — G89.28 OTHER CHRONIC POSTPROCEDURAL PAIN: ICD-10-CM

## 2024-06-06 DIAGNOSIS — R10.9 ABDOMINAL PAIN, UNSPECIFIED ABDOMINAL LOCATION: Primary | ICD-10-CM

## 2024-06-06 DIAGNOSIS — G58.8 ENTRAPMENT SYNDROME OF CUTANEOUS NERVE OF ABDOMEN: ICD-10-CM

## 2024-06-06 DIAGNOSIS — G89.29 CHRONIC NEUROPATHIC PAIN: ICD-10-CM

## 2024-06-06 PROCEDURE — 99211 OFF/OP EST MAY X REQ PHY/QHP: CPT

## 2024-06-06 RX ORDER — METHYLPREDNISOLONE 4 MG/1
TABLET ORAL
Qty: 21 TABLET | Refills: 0 | Status: SHIPPED | OUTPATIENT
Start: 2024-06-06

## 2024-06-06 NOTE — PROGRESS NOTES
Patient presents in office today with reported pain in abdomen and R elbow    Last Sx: Diagnostic ganglion impar block on 11/28/23    Current pain level reported = 5-6/10  Last reported dose of NA    Narcotic Contract renewal NA  Urine Drug screen NA

## 2024-06-06 NOTE — PATIENT INSTRUCTIONS

## 2024-06-06 NOTE — CHRONIC PAIN
[facility]  Follow Up Visit         History of Present Illness:    Tyesha Chowdhury is a 48 year old female, referred to the pain clinic for right upper quadrant abdominal pain, which began soon after cholecystectomy surgery. The patient underwent previously TAP block on the Right side which provided 100% pain relief for about 3 months. The pain started returning recently and the patient wishes to have the block repeated today.   The pain is unchanged in terms of the location and character. There is no new symptoms associated with it suggesting intraabdominal process.    The patient also complains of pain around and at her tailbone, which is chronic pain related to past injury/fall. The pain has been bothering her for a long time and failed to get better/resolve with medications and exercise. There was no injection done for this pain and she is interested in attempting one.       Blood Thinning Medications:  None    Current Medications:  Current Outpatient Medications   Medication Sig Dispense Refill    lisdexamfetamine (VYVANSE) 30 MG Oral Cap Take 1 capsule (30 mg total) by mouth every morning. 30 capsule 0    Tirzepatide-Weight Management (ZEPBOUND) 10 MG/0.5ML Subcutaneous Solution Auto-injector Inject 10 mg into the skin once a week. 2 mL 0    amoxicillin clavulanate 875-125 MG Oral Tab Take 1 tablet by mouth 2 (two) times daily.      clotrimazole-betamethasone 1-0.05 % External Cream Apply 1 Application topically 2 (two) times daily.      sertraline 50 MG Oral Tab Take 1 tablet (50 mg total) by mouth every evening. 90 tablet 3        Allergies:  No Known Allergies     Review of Systems:  Bowel/Bladder Incontinence:  As above  Numbness/tingling:  As above  Weakness:  As above  Weight Loss:  Negative  Fever:  Negative  Cardiovascular:  No current chest pain or palpitations  Respiratory:  No current shortness of breath  Gastrointestinal:  No active ulcer  Genitourinary:   Negative  Integumentary:  Negative  Psychiatric:  Negative  Hematologic:  No active bleeding  Lymphatic:  No current lymphedema  Allergic/Immunologic:  Negative  Musculoskeletal:  As above  Neurological:  As above  Denies chest pain, shortness of breath.    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    Coccygeal pain        Past Medical History:    Allergic rhinitis    Anxiety    Esophageal reflux    Shingles       Surgical History:  Past Surgical History:   Procedure Laterality Date    Cholecystectomy  2021    Colonoscopy      Normal    Colonoscopy  2021    Normal     Colonoscopy      [facility]      08, 1/19/10, 3/1/13        Family History:   Family History   Problem Relation Age of Onset    Cancer Father 79        Lung cancer    Colon Cancer Maternal Grandfather         Social History:  Social History     Socioeconomic History    Marital status:      Spouse name: Not on file    Number of children: Not on file    Years of education: Not on file    Highest education level: Not on file   Occupational History    Not on file   Tobacco Use    Smoking status: Never    Smokeless tobacco: Never   Vaping Use    Vaping status: Never Used   Substance and Sexual Activity    Alcohol use: Yes     Alcohol/week: 1.0 standard drink of alcohol     Types: 1 Glasses of wine per week     Comment: socailly    Drug use: Never    Sexual activity: Yes     Partners: Male   Other Topics Concern    Caffeine Concern Yes    Exercise Yes    Seat Belt Yes    Special Diet No    Stress Concern No    Weight Concern Yes     Comment: I lost 30 lbs. then gained back 20     Service Not Asked    Blood Transfusions Not Asked    Occupational  Exposure Not Asked    Hobby Hazards Not Asked    Sleep Concern Not Asked    Back Care Not Asked    Bike Helmet Not Asked    Self-Exams Not Asked   Social History Narrative    The patient does not use an assistive device..      The patient does live in a home with stairs.     Social Determinants of Health     Financial Resource Strain: Not on file   Food Insecurity: Not on file   Transportation Needs: Not on file   Physical Activity: Not on file   Stress: Not on file   Social Connections: Unknown (3/13/2021)    [facility]    Social Connections     Conversations with friends/family/neighbors per week: Not on file   Housing Stability: Low Risk  (7/17/2021)    [facility]    Housing Stability     Mortgage Payment Concerns?: Not on file     Number of Places Lived in the Last Year: Not on file     Unstable Housing?: Not on file        Physical Examination:  Vitals:    06/06/24 0908   BP: 92/70   Pulse: 82        General:  Alert and oriented x3  Affect:  NAD  Head:  Normocephalic, atraumatic  Eyes:  anicteric; no injection  CV:  Reg rate  Respiratory:  Non labored breathing on room air   Gait: non antalgic, cane user:  no  Spine:  Normal   Lumbar ROM: normal   Abdomen: soft; non distended; non rigid; active bowel sounds; tenderness to palpation present at the right upper quadrant right under the costal margin and right lower lateral ribcage  Carnett's test: positive for RUQ pain when abdomen flexed and when supine with legs elevated  Neurologic: normal motor strength and intact sensation upper and lower extremities b/l          IMAGING:  PROCEDURE: US ABDOMEN LIMITED (CPT=76705)       COMPARISON:   None.       INDICATIONS:   G89.29 Chronic RLQ pain R10.31 Chronic RLQ pain R14.0 Abdominal bloating       TECHNIQUE:   Limited evaluation of the areas indicated in the order with gray scale and  colorflow of the main vessels where appropriate.  Areas scanned:  Liver gallbladder pancreas and right kidney       FINDINGS:   LIVER:   Normal size, echotexture and color flow.  No masses or duct dilatation. Color flow and Doppler imaging of the portal and hepatic veins show patency and antegrade flow.   GALLBLADDER:   Cholecystectomy   BILE DUCTS:   Normal.  Common bile duct measures 2.9 mm.     PANCREAS:   Visualized portions are unremarkable.  Distal pancreatic tail obscured   OTHER:   No free fluid hepatorenal fossa. No hydronephrosis right kidney.       CONCLUSION:   1. Normal hepatic sonogram.  No biliary dilatation.   2. Cholecystectomy.  Normal caliber common duct.   3. No free fluid hepatorenal fossa. No hydronephrosis right kidney.   4. Visualized portions the pancreas unremarkable.  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   Repeat TAP block under US guidance today  in the office with good outcome tolerated well.   Also complaint of tailbone pain due to coccydynia, awaiting ganglion impar block which postponed until Dec 2023.         Pt will return to clinic for follow up 2 weeks after the block      Time spent: 28 minutes         PROCEDURE NOTE           Date of service: 10/24/2023    : 11/15/1975    Procedure: Right sided transversus abdominis plane block     Pre-procedure diagnosis: abdominal pain; abdominal cutaneous nerve entrapment     Post-procedure diagnosis: abdominal pain; abdominal cutaneous nerve entrapment     Surgeon: Inez Corey DO       Procedure: The patient was position supine on the procedure table.The area of injection was  prepped with chlorhexidine and draped with sterile towels sterile manner. The procedure was performed aseptically.   A linear ultrasound transducer was covered with sterile covering and was used in the transverse axis visualize the three lateral abdominal wall muscle layers. The transducer was placed in the axial plane on the midaxillary line between the subcostal margin and the iliac crest and the three layers of abdominal wall muscles were visualized: external and internal oblique as well as the transversus abdominis muscles.   Skin and tissue was infiltrated at the needle placement site. Next, 100mm long echogenic insulated needle was introduced into the skin and the needle tip was advanced under ultrasound guidance until reached the final target location, the fascial plane between the internal oblique and the transversus abdominis muscles. After negative aspiration for blood, fluid and air, 10ml of injectate consisting of 40mg methylprednisolone mixed with 0.25% bupivacaine was injected incrementally. The needle was removed and bandaid was placed over the needle entry points.     There was no significant pain or paresthesias during the procedure. There were no signs of local anesthetic toxicity. There were no other complications observed or reported. The patient tolerated procedure well. The patient was monitored for 10 minutes after the block and was discharged home in stable ambulatory condition. The patient reported complete pain relief from the block before leaving the office. The follow up visit is scheduled in 2 weeks.       Inez Corey DO  Anesthesiology  Pain Medicine

## 2024-06-07 ENCOUNTER — TELEPHONE (OUTPATIENT)
Dept: PAIN CLINIC | Facility: HOSPITAL | Age: 49
End: 2024-06-07

## 2024-06-07 RX ORDER — TIRZEPATIDE 10 MG/.5ML
10 INJECTION, SOLUTION SUBCUTANEOUS WEEKLY
Qty: 2 ML | Refills: 1 | Status: SHIPPED | OUTPATIENT
Start: 2024-06-07

## 2024-06-07 NOTE — TELEPHONE ENCOUNTER
Prior authorization request completed for: Left transversus abdominis block   Authorization #NO PRIOR AUTHORIZATION IS REQUIRED   Pre-D: Not required  Exclusions/Restrictions: Based on medical necessity  Covered Benefit: Based on medical necessity  Authorization dates: N/A   CPT codes approved: 40325   Number of visits/dates of service approved: 1  Physician: Leora   Location: [facility]     Call Ref#: [id number]  Representative Name: [facility] automatic prior authorization intaker  Insurance Carrier: [employer] [phone]    Patient can be scheduled.

## 2024-06-07 NOTE — TELEPHONE ENCOUNTER
Patient son would like to renew the refills to [facility] for patient. There is only a week left of meds   Verified name and .    Patient calling to follow up on refill request.     Routing to refill team.

## 2024-06-13 PROBLEM — G89.29 CHRONIC NEUROPATHIC PAIN: Status: ACTIVE | Noted: 2024-06-13

## 2024-06-13 PROBLEM — M79.2 CHRONIC NEUROPATHIC PAIN: Status: ACTIVE | Noted: 2024-06-13

## 2024-06-13 PROBLEM — M25.521 ELBOW PAIN, RIGHT: Status: ACTIVE | Noted: 2024-06-13

## 2024-06-13 PROBLEM — G89.28 OTHER CHRONIC POSTPROCEDURAL PAIN: Status: ACTIVE | Noted: 2024-06-13

## 2024-06-18 ENCOUNTER — OFFICE VISIT (OUTPATIENT)
Facility: CLINIC | Age: 49
End: 2024-06-18

## 2024-06-18 VITALS
SYSTOLIC BLOOD PRESSURE: 124 MMHG | DIASTOLIC BLOOD PRESSURE: 70 MMHG | HEART RATE: 78 BPM | BODY MASS INDEX: 21.97 KG/M2 | OXYGEN SATURATION: 99 % | WEIGHT: 140 LBS | HEIGHT: 67 IN

## 2024-06-18 DIAGNOSIS — M77.11 RIGHT LATERAL EPICONDYLITIS: Primary | ICD-10-CM

## 2024-06-18 DIAGNOSIS — F41.9 ANXIETY: ICD-10-CM

## 2024-06-18 DIAGNOSIS — Z76.89 ENCOUNTER FOR WEIGHT MANAGEMENT: ICD-10-CM

## 2024-06-18 DIAGNOSIS — H91.93 BILATERAL HEARING LOSS, UNSPECIFIED HEARING LOSS TYPE: ICD-10-CM

## 2024-06-18 DIAGNOSIS — Z12.31 VISIT FOR SCREENING MAMMOGRAM: ICD-10-CM

## 2024-06-18 PROCEDURE — 99214 OFFICE O/P EST MOD 30 MIN: CPT | Performed by: FAMILY MEDICINE

## 2024-06-18 RX ORDER — METHYLPREDNISOLONE 4 MG/1
TABLET ORAL
Qty: 21 EACH | Refills: 0 | Status: SHIPPED | OUTPATIENT
Start: 2024-06-18

## 2024-06-18 RX ORDER — ESTRADIOL AND PROGESTERONE 1; 100 MG/1; MG/1
1 CAPSULE ORAL DAILY
COMMUNITY
Start: 2024-06-04

## 2024-06-19 ENCOUNTER — HOSPITAL ENCOUNTER (OUTPATIENT)
Dept: MAMMOGRAPHY | Age: 49
Discharge: HOME | End: 2024-06-19
Attending: FAMILY MEDICINE

## 2024-06-19 DIAGNOSIS — Z12.31 VISIT FOR SCREENING MAMMOGRAM: ICD-10-CM

## 2024-06-19 PROCEDURE — 77067 SCR MAMMO BI INCL CAD: CPT | Performed by: FAMILY MEDICINE

## 2024-06-19 PROCEDURE — 77063 BREAST TOMOSYNTHESIS BI: CPT | Performed by: FAMILY MEDICINE

## 2024-06-19 NOTE — TELEPHONE ENCOUNTER
Spoke with Tyesha (patient)     Procedure scheduled with  for 07/15/2024 - Hospital   Order faxed to surgery scheduling, confirmation received.     Post procedure follow up not scheduled at this time.

## 2024-07-08 DIAGNOSIS — F90.0 ATTENTION DEFICIT HYPERACTIVITY DISORDER (ADHD), PREDOMINANTLY INATTENTIVE TYPE: ICD-10-CM

## 2024-07-11 RX ORDER — LISDEXAMFETAMINE DIMESYLATE 30 MG/1
30 CAPSULE ORAL EVERY MORNING
Qty: 30 CAPSULE | Refills: 0 | Status: SHIPPED | OUTPATIENT
Start: 2024-07-11

## 2024-07-11 NOTE — TELEPHONE ENCOUNTER
Please review; protocol failed/ has no protocol      Recent fills: 05/09/2024,03/22/2024,01/31/2024  Last Rx written: 05/09/2024  Last Office Visit: 06/18/2024    Recent Visits  Date Type Provider Dept   06/18/24 Office Visit Lisa Reeves DO [facility]     Future Appointments  Date Type Provider Dept   10/09/24 Appointment Lisa Reeves DO [facility]       Requested Prescriptions   Pending Prescriptions Disp Refills    lisdexamfetamine (VYVANSE) 30 MG Oral Cap 30 capsule 0     Sig: Take 1 capsule (30 mg total) by mouth every morning.       Controlled Substance Medication Failed - 7/8/2024  1:47 PM        Failed - This medication is a controlled substance - forward to provider to refill           Recent Outpatient Visits              3 weeks ago Right lateral epicondylitis    [facility] Lisa Reeves DO    Office Visit    1 month ago Abdominal pain, unspecified abdominal location    [facility] Inez Corey DO    Office Visit    8 months ago TMJ (temporomandibular joint disorder)    [facility]    Office Visit    8 months ago Other infective acute otitis externa of right ear    [facility] Makayla Hendrickson MD    Office Visit    8 months ago 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

## 2024-07-12 ENCOUNTER — TELEPHONE (OUTPATIENT)
Dept: PAIN CLINIC | Facility: HOSPITAL | Age: 49
End: 2024-07-12

## 2024-07-12 NOTE — TELEPHONE ENCOUNTER
Patient says she will have a procedure done this Monday by Doctor Corey on her \"abdomen for\" her \"side\". She asked if she can have it also done in her Elbow--because Prednisone did not help her--since she will already be here.

## 2024-07-12 NOTE — TELEPHONE ENCOUNTER
Patient informed that that is not possible cause of Prior Authorization and that Doctor Leora is unavailable   (Vacation) at this time--patient was told she can mention her request to the Doctor when she comes for the procedure and maybe the Doctor can place an order for it--which would still need to be processed for Prior Authorization and she would not be able to have it done on Monday--or and Office Visit will be needed for it.    When talking to patient she request another Doctor to place the order for her to have it done on Monday, another Doctor would not place a procedure order for her this way, she would need and Office Visit with the other provider before he/she could order a procedure. Patient stated understanding.

## 2024-07-12 NOTE — DISCHARGE INSTRUCTIONS
POST PROCEDURE CARE AND INFECTION PREVENTION:    1.   Your dressing should be removed within 24 hours.  If it falls off before that time, you need not reapply.    2.   You may shower tomorrow.    3.   If necessary, you may apply ice to the injection site for 20 minute intervals to help alleviate any localized discomfort.    4.  The [facility] office number is [phone].  Call and report the following conditions to the [facility]:   -Any excessive bleeding, swelling, or pain at the injection site.   -Any temperature greater than 101 degrees.   -Any excessive itch or rash.   -Any change in your bowel or bladder habits.   -Any increased numbness, tingling, or weakness to the extremities.   -Any persistent, severe headache (especially a headache aggravated by being in   An upright position.  The office is open between the hours of 7:30 am - 2:00pm.  After hours you may leave a message and the nurse will return your call during normal office hours.    5.  Please call the [facility] in one week to schedule an appointment for your follow up visit unless instructed differently by your doctor.  If you need to cancel or reschedule any appointment, please call as soon as possible.    6.  You may return to school or work per your doctor's instructions.    7.  Wash your hands before and after touching your dressing.  Be sure to rub your hands together with warm water and soap for 20 seconds or longer when washing.        MEDICATION:    1.  You may resume all your usual medications unless instructed otherwise by your doctor.    2.  To alleviate pain, you may take your over the counter or prescription pain medications as per the label instructions.    3.  Se Medication Reconciliation form for any new prescriptions.    4.  Do not drive, operate heavy machinery, or drink alcoholic beverages while taking narcotic pain medication.  Narcotic pain medication may cause constipation.   If no bowel movement in 48 hours, please contact your physician.        IN CASE OF EMERGENCY:  If you are unable to reach your doctor, call or go to the nearest emergency room.  The [facility] Emergency Department's phone number is [phone].      INSTRUCTIONS FOR PATIENT WITH GENERAL/IV SEDATION ONLY:  1.  Begin clear liquids and bland foods then progress to your normal diet if you are not nauseated.    2.  Nausea and vomiting occasionally occur after surgery. If you are nauseated, remain on clear liquids until it passes.  If nausea persists longer than 24 hours, please notify your doctor.      3.  Rest on the day of surgery.  You may increase activity as tolerated starting tomorrow.    4. Do not drive, operate hazardous machinery, or make important personal or legal decisions for 24 hours.    5.  You may feel dizzy or lightheaded from anesthesia.  Move cautiously for 24 hours.

## 2024-07-15 ENCOUNTER — HOSPITAL ENCOUNTER (OUTPATIENT)
Facility: HOSPITAL | Age: 49
Setting detail: HOSPITAL OUTPATIENT SURGERY
Discharge: HOME | End: 2024-07-15
Attending: ANESTHESIOLOGY | Admitting: ANESTHESIOLOGY
Payer: COMMERCIAL

## 2024-07-15 ENCOUNTER — ANESTHESIA EVENT (OUTPATIENT)
Dept: SURGERY | Facility: HOSPITAL | Age: 49
End: 2024-07-15
Payer: COMMERCIAL

## 2024-07-15 ENCOUNTER — APPOINTMENT (OUTPATIENT)
Dept: GENERAL RADIOLOGY | Facility: HOSPITAL | Age: 49
End: 2024-07-15
Attending: ANESTHESIOLOGY
Payer: COMMERCIAL

## 2024-07-15 ENCOUNTER — ANESTHESIA (OUTPATIENT)
Dept: SURGERY | Facility: HOSPITAL | Age: 49
End: 2024-07-15
Payer: COMMERCIAL

## 2024-07-15 VITALS
DIASTOLIC BLOOD PRESSURE: 84 MMHG | HEART RATE: 66 BPM | OXYGEN SATURATION: 100 % | HEIGHT: 67 IN | RESPIRATION RATE: 12 BRPM | BODY MASS INDEX: 21.97 KG/M2 | TEMPERATURE: 98 F | WEIGHT: 140 LBS | SYSTOLIC BLOOD PRESSURE: 132 MMHG

## 2024-07-15 PROCEDURE — 3E0T3BZ INTRODUCTION OF ANESTHETIC AGENT INTO PERIPHERAL NERVES AND PLEXI, PERCUTANEOUS APPROACH: ICD-10-PCS | Performed by: ANESTHESIOLOGY

## 2024-07-15 PROCEDURE — 3E0T33Z INTRODUCTION OF ANTI-INFLAMMATORY INTO PERIPHERAL NERVES AND PLEXI, PERCUTANEOUS APPROACH: ICD-10-PCS | Performed by: ANESTHESIOLOGY

## 2024-07-15 RX ORDER — BUPIVACAINE HYDROCHLORIDE 5 MG/ML
INJECTION, SOLUTION EPIDURAL; INTRACAUDAL AS NEEDED
Status: DISCONTINUED | OUTPATIENT
Start: 2024-07-15 | End: 2024-07-15 | Stop reason: HOSPADM

## 2024-07-15 RX ORDER — DEXAMETHASONE SODIUM PHOSPHATE 10 MG/ML
INJECTION, SOLUTION INTRAMUSCULAR; INTRAVENOUS AS NEEDED
Status: DISCONTINUED | OUTPATIENT
Start: 2024-07-15 | End: 2024-07-15 | Stop reason: HOSPADM

## 2024-07-16 NOTE — BRIEF OP NOTE
PROCEDURE NOTE           Date of service: 07/15/2024     : 11/15/1975    Procedure: Right sided transversus abdominis plane block  under ultrasound guidance     Pre-procedure diagnosis: abdominal pain; abdominal cutaneous nerve entrapment     Post-procedure diagnosis: abdominal pain; abdominal cutaneous nerve entrapment     Surgeon: Inez Corey DO       Procedure: The patient was position supine on the procedure table.The area of injection was prepped with chlorhexidine and draped with sterile towels sterile manner. The procedure was performed aseptically.   A linear ultrasound transducer was covered with sterile covering and was used in the transverse axis visualize the three lateral abdominal wall muscle layers. The transducer was placed in the axial plane on the midaxillary line between the subcostal margin and the iliac crest and the three layers of abdominal wall muscles were visualized: external and internal oblique as well as the transversus abdominis muscles.   Skin and tissue was infiltrated at the needle placement site. Next, 50mm long echogenic insulated needle was introduced into the skin and the needle tip was advanced under ultrasound guidance until reached the final target location, the fascial plane between the internal oblique and the transversus abdominis muscles. After negative aspiration for blood, fluid and air, 10ml of injectate consisting of 10mg dexamethasone mixed with 9ml of 0.25% bupivacaine was injected incrementally.    The needle was removed and bandaid was placed over the needle entry points.     There was no significant pain or paresthesias during the procedure. There were no signs of local anesthetic toxicity. There were no other complications observed or reported. The patient tolerated procedure well. The patient was monitored for 10 minutes after the block and was discharged home in stable ambulatory condition. The patient reported complete pain relief from the block  before leaving the office. The follow up visit is scheduled in 2 weeks.       Inez Corey DO  Anesthesiology  Pain Medicine

## 2024-07-16 NOTE — H&P
[facility]    Tyesha Chowdhury Patient Status:  Hospital Outpatient Surgery    11/15/1975 MRN [id number]   Location [facility] PRE OP RECOVERY Attending Inez Corey DO   Hosp Day # 0 PCP Lisa Reeves DO       HPI:  Tyesha Chowdhury is a 48 year old female with right sided abdominal pain. She had previously done TAP block with 100% pain relief lasting for more than 1 year.       Allergies: No Known Allergies    Past Medical History:    Allergic rhinitis    Anxiety    Esophageal reflux    Shingles     Current Medications:         Current Outpatient Medications   Medication Sig Dispense Refill    Tirzepatide (MOUNJARO) 7.5 MG/0.5ML Subcutaneous Solution Pen-injector Inject 7.5 mg into the skin once a week. 6 mL 0    lisdexamfetamine (VYVANSE) 20 MG Oral Cap Take 1 capsule (20 mg total) by mouth every morning. 30 capsule 0    sertraline 50 MG Oral Tab Take 1 tablet (50 mg total) by mouth every evening. 90 tablet 3      Review of Systems  Bowel/Bladder Incontinence:  As above  Numbness/tingling:  As above  Weakness:  As above  Weight Loss:  Negative  Fever:  Negative  Cardiovascular:  No current chest pain or palpitations  Respiratory:  No current shortness of breath  Gastrointestinal:  No active ulcer  Genitourinary:  Negative  Integumentary:  Negative  Psychiatric:  Negative  Hematologic:  No active bleeding  Lymphatic:  No current lymphedema  Allergic/Immunologic:  Negative  Musculoskeletal:  As above  Neurological:  As above  Denies chest pain, shortness of breath.    Physical Exam  Blood pressure 122/89, pulse 72, temperature 97.6 °F (36.4 °C), temperature source Oral, resp. rate 16, height 1.702 m (5' 7\"), weight 63.5 kg (140 lb), SpO2 99%, not currently breastfeeding.    General:  Alert and oriented x3  Affect:  NAD  Head:  Normocephalic, atraumatic  Eyes:  anicteric; no injection  CV:  Reg rate  Respiratory:  Non labored breathing on room air   Gait: non antalgic, cane  user:  no  Spine:  Normal   Lumbar ROM: normal   Neuro: no focal sensory or motor deficits   Abdomen: tenderness to palpation right abdomen      Assessment:  48 year old female with complaint of recurrent right sided abdominal pain due to ACNES         Plan:  US guided right sided TAP block with local anesthesia     Follow up in 1-2 week     Inez Corey DO  Anesthesiology  Pain Medicine

## 2024-08-05 DIAGNOSIS — F41.9 ANXIETY: ICD-10-CM

## 2024-08-05 DIAGNOSIS — E66.3 OVERWEIGHT (BMI 25.0-29.9): ICD-10-CM

## 2024-08-08 RX ORDER — TIRZEPATIDE 10 MG/.5ML
10 INJECTION, SOLUTION SUBCUTANEOUS WEEKLY
Qty: 2 ML | Refills: 1 | Status: SHIPPED | OUTPATIENT
Start: 2024-08-08

## 2024-08-08 NOTE — TELEPHONE ENCOUNTER
Please review. Protocol Failed; No Protocol    Requested Prescriptions   Pending Prescriptions Disp Refills    Tirzepatide-Weight Management (ZEPBOUND) 10 MG/0.5ML Subcutaneous Solution Auto-injector 2 mL 1     Sig: Inject 10 mg into the skin once a week.       There is no refill protocol information for this order            Future Appointments         Provider Department Appt Notes    In 2 months Lisa Reeves DO [facility]           Recent Outpatient Visits              1 month ago Right lateral epicondylitis    [facility] Lisa Reeves DO    Office Visit    2 months ago Abdominal pain, unspecified abdominal location    [facility] Inez Corey DO    Office Visit    9 months ago TMJ (temporomandibular joint disorder)    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

## 2024-08-19 ENCOUNTER — TELEPHONE (OUTPATIENT)
Facility: CLINIC | Age: 49
End: 2024-08-19

## 2024-08-19 DIAGNOSIS — E66.3 OVERWEIGHT (BMI 25.0-29.9): ICD-10-CM

## 2024-08-19 DIAGNOSIS — F41.9 ANXIETY: ICD-10-CM

## 2024-08-20 NOTE — TELEPHONE ENCOUNTER
Approved    Prior authorization approved  Payer: [facility] [place] Case ID: [id number]    [phone]    [phone]  Note from payer: Your PA request has been approved.  Additional information will be provided in the approval communication. (Message 1147)  Approval Details    Authorized from August 19, 2024 to August 19, 2025  Electronic appeal: Not supported  View History

## 2024-08-30 DIAGNOSIS — F90.0 ATTENTION DEFICIT HYPERACTIVITY DISORDER (ADHD), PREDOMINANTLY INATTENTIVE TYPE: ICD-10-CM

## 2024-09-03 RX ORDER — LISDEXAMFETAMINE DIMESYLATE 30 MG/1
30 CAPSULE ORAL EVERY MORNING
Qty: 30 CAPSULE | Refills: 0 | Status: SHIPPED | OUTPATIENT
Start: 2024-09-03 | End: 2024-09-05

## 2024-09-03 NOTE — TELEPHONE ENCOUNTER
Please review; protocol failed/ has no protocol      Recent fills: 07/11/2024,05/09/2024,03/222/024  Last Rx written: 07/11/2024  Last Office Visit: 06/18/2024    Recent Visits  Date Type Provider Dept   06/18/24 Office Visit Lisa Reeves DO [facility]     Future Appointments  Date Type Provider Dept   10/09/24 Appointment Lisa Reeves DO [facility]   Showing future appointments within next 150 days with a meds authorizing provider and meeting all other requirements      Requested Prescriptions   Pending Prescriptions Disp Refills    lisdexamfetamine (VYVANSE) 30 MG Oral Cap 30 capsule 0     Sig: Take 1 capsule (30 mg total) by mouth every morning.       Controlled Substance Medication Failed - 8/30/2024  6:16 PM        Failed - This medication is a controlled substance - forward to provider to refill         Signed Prescriptions Disp Refills    sertraline 50 MG Oral Tab 90 tablet 3     Sig: Take 1 tablet (50 mg total) by mouth at bedtime.       Psychiatric Non-Scheduled (Anti-Anxiety) Passed - 8/30/2024  6:16 PM        Passed - In person appointment or virtual visit in the past 6 mos or appointment in next 3 mos     Recent Outpatient Visits              2 months ago Right lateral epicondylitis    [facility] Lisa Reeves DO    Office Visit    2 months ago Abdominal pain, unspecified abdominal location    [facility] Inez Corey DO    Office Visit    9 months ago TMJ (temporomandibular joint disorder)    [facility] Robert Branch MD    Office Visit    10 months ago Other infective acute otitis externa of right ear    [facility] Makayla Hendrickson MD    Office Visit    10 months ago 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    [facility] Inez Corey DO    Office Visit          Future Appointments          Provider Department Appt Notes    In 1 month Lisa Reeves DO [facility]                     Passed - Depression Screening completed within the past 12 months           Recent Outpatient Visits              2 months ago Right lateral epicondylitis    [facility] Lisa Reeves DO    Office Visit    2 months ago Abdominal pain, unspecified abdominal location    [facility] Inez Corey,     Office Visit    9 months ago TMJ (temporomandibular joint disorder)    [facility]    Office Visit    10 months ago Other infective acute otitis externa of right ear    [facility] Makayla Hendrickson MD    Office Visit    10 months ago 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

## 2024-09-03 NOTE — TELEPHONE ENCOUNTER
Refill passed per [facility] protocol.  Requested Prescriptions   Pending Prescriptions Disp Refills    sertraline 50 MG Oral Tab  0     Sig: Take 1 tablet (50 mg total) by mouth at bedtime.       Psychiatric Non-Scheduled (Anti-Anxiety) Passed - 8/30/2024  6:16 PM        Passed - In person appointment or virtual visit in the past 6 mos or appointment in next 3 mos     Recent Outpatient Visits              2 months ago Right lateral epicondylitis    [facility] Lisa Reeves DO    Office Visit    2 months ago Abdominal pain, unspecified abdominal location    [facility] Inez Corey DO    Office Visit    9 months ago TMJ (temporomandibular joint disorder)    [facility]    Office Visit    10 months ago Other infective acute otitis externa of right ear    [facility] Makayla Hendrickson MD    Office Visit    10 months ago 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       Controlled Substance Medication Failed - 8/30/2024  6:16 PM        Failed - This medication is a controlled substance - forward to provider to refill           Recent Outpatient Visits              2 months ago Right lateral epicondylitis    [facility] Lisa Reeves DO    Office Visit    2 months ago Abdominal pain, unspecified abdominal location    [facility] Inez Corey DO    Office Visit    9 months ago TMJ (temporomandibular joint disorder)    [facility] Robert Branch MD    Office Visit    10 months ago Other infective acute otitis externa of right ear    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 [place]

## 2024-09-04 ENCOUNTER — PATIENT MESSAGE (OUTPATIENT)
Facility: CLINIC | Age: 49
End: 2024-09-04

## 2024-09-04 DIAGNOSIS — F90.0 ATTENTION DEFICIT HYPERACTIVITY DISORDER (ADHD), PREDOMINANTLY INATTENTIVE TYPE: ICD-10-CM

## 2024-09-05 RX ORDER — LISDEXAMFETAMINE DIMESYLATE 30 MG/1
30 CAPSULE ORAL EVERY MORNING
Qty: 30 CAPSULE | Refills: 0 | Status: SHIPPED | OUTPATIENT
Start: 2024-09-05

## 2024-09-05 NOTE — TELEPHONE ENCOUNTER
Correct, patient would now like at the [facility] in [place].    Originally sent to the [facility] in [place]

## 2024-09-05 NOTE — TELEPHONE ENCOUNTER
Dr. Hendrickson please disregard.  Routing to pod mate(s) due to High Priority status and Dr. Reeves is out of office.    Please review; protocol failed    Future Appointments   Date Time Provider Department Center   10/9/2024 11:00 AM Lisa Reeves DO [facility]     LAST OFFICE VISIT: 6/18/2024      Recent fill dates: 7/11/24, 5/9/24, and 3/22/24     For qty of: #30 each for a 30 day supply  Date of  last written prescription:      Date: 7/11/2024    Requested Prescriptions   Pending Prescriptions Disp Refills    lisdexamfetamine (VYVANSE) 30 MG Oral Cap 30 capsule 0     Sig: Take 1 capsule (30 mg total) by mouth every morning.       Controlled Substance Medication Failed - 9/5/2024  9:56 AM        Failed - This medication is a controlled substance - forward to provider to refill             Future Appointments         Provider Department Appt Notes    In 1 month Lisa Reeves DO [facility]           Recent Outpatient Visits              2 months ago Right lateral epicondylitis    [facility] Lisa Reeves DO    Office Visit    3 months ago Abdominal pain, unspecified abdominal location    [facility] Inez Corey DO    Office Visit    10 months ago TMJ (temporomandibular joint disorder)    [facility] [place] Robert Branch MD    Office Visit    10 months ago Other infective acute otitis externa of right ear    [facility] Makayla Hendrickson MD    Office Visit    10 months ago 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

## 2024-09-05 NOTE — TELEPHONE ENCOUNTER
From: Tyesha Chowdhury  To: Lisa Reeves  Sent: 9/4/2024 12:57 PM CDT  Subject: Vyvanse - [facility]     Can you please send to [facility] in [place] instead?

## 2024-09-05 NOTE — TELEPHONE ENCOUNTER
Pended and routed to refill pool to run protocol    Patient requesting for Vyvanse to be sent to [facility] if [facility]    Requested Prescriptions     Pending Prescriptions Disp Refills    lisdexamfetamine (VYVANSE) 30 MG Oral Cap 30 capsule 0     Sig: Take 1 capsule (30 mg total) by mouth every morning.         [id number] - [place], [place] - [address] [phone], [phone]  [address] [address]  Phone: [phone] Fax: [phone]

## 2024-09-19 ENCOUNTER — OFFICE VISIT (OUTPATIENT)
Dept: PAIN CLINIC | Facility: HOSPITAL | Age: 49
End: 2024-09-19
Attending: ANESTHESIOLOGY
Payer: COMMERCIAL

## 2024-09-19 VITALS
SYSTOLIC BLOOD PRESSURE: 109 MMHG | RESPIRATION RATE: 18 BRPM | HEIGHT: 67 IN | HEART RATE: 71 BPM | BODY MASS INDEX: 21.19 KG/M2 | WEIGHT: 135 LBS | OXYGEN SATURATION: 98 % | DIASTOLIC BLOOD PRESSURE: 71 MMHG

## 2024-09-19 DIAGNOSIS — R10.9 ABDOMINAL PAIN, UNSPECIFIED ABDOMINAL LOCATION: ICD-10-CM

## 2024-09-19 DIAGNOSIS — G58.8 ENTRAPMENT SYNDROME OF CUTANEOUS NERVE OF ABDOMEN: ICD-10-CM

## 2024-09-19 DIAGNOSIS — M54.12 CERVICAL RADICULOPATHY: Primary | ICD-10-CM

## 2024-09-19 PROCEDURE — 99211 OFF/OP EST MAY X REQ PHY/QHP: CPT

## 2024-09-19 NOTE — CHRONIC PAIN
[facility]  Follow Up Visit         History of Present Illness:    Tyesha Chowdhury is a 48 year old female, referred to the pain clinic for right upper quadrant abdominal pain, which began soon after cholecystectomy surgery. The patient underwent previously TAP blocks on the Right side , last one on 05/18/2023, which provided 100% pain relief for 1 year duration. The pain started to return recently in the same location resembling the same pain she had previously. Today rates the pain as 5-6/10.    There is no new symptoms associated with it suggesting intraabdominal process.  Also, complaints of recent onset of Right lateral  elbow pain. There is no radicular arm pain, weakness or paresthesia in the associated arm.    Still experiencing pain at the lateral right elbow    Cannot do push ups, biceps curls with elevation overhead   Sometimes wakes her up     Achy sharp sensation     Also pain at the right thumb feels same     And behind/around R shoulder blades   No numbness/tingling in the arm  No weakness of    And not dropping objects   Sometimes having difficulty unscrewing things and lifting pitcher     She had TAP block and has almost complete pain relief down to 2/10. Pain sis sharp on the right lateral abdomen        Blood Thinning Medications:  None      Current Medications:  Current Outpatient Medications   Medication Sig Dispense Refill    lisdexamfetamine (VYVANSE) 30 MG Oral Cap Take 1 capsule (30 mg total) by mouth every morning. 30 capsule 0    sertraline 50 MG Oral Tab Take 1 tablet (50 mg total) by mouth at bedtime. 90 tablet 3    Tirzepatide-Weight Management (ZEPBOUND) 10 MG/0.5ML Subcutaneous Solution Auto-injector Inject 10 mg into the skin once a week. 2 mL 1    BIJUVA 1-100 MG Oral Cap Take 1 capsule by mouth daily.          Allergies:  No Known Allergies     Review of Systems:  Bowel/Bladder Incontinence:  As above  Numbness/tingling:  As above  Weakness:  As  above  Weight Loss:  Negative  Fever:  Negative  Cardiovascular:  No current chest pain or palpitations  Respiratory:  No current shortness of breath  Gastrointestinal:  No active ulcer  Genitourinary:  Negative  Integumentary:  Negative  Psychiatric:  Negative  Hematologic:  No active bleeding  Lymphatic:  No current lymphedema  Allergic/Immunologic:  Negative  Musculoskeletal:  As above  Neurological:  As above  Denies chest pain, shortness of breath.    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    Coccygeal pain    Chronic neuropathic pain    Other chronic postprocedural pain    Elbow pain, right        Past Medical History:    Allergic rhinitis    Anxiety    Esophageal reflux    Shingles       Surgical History:  Past Surgical History:   Procedure Laterality Date    Cholecystectomy  2021    Colonoscopy  2018    Normal    Colonoscopy  2021    Normal     Colonoscopy      [facility]      08, 1/19/10, 3/1/13        Family History:   Family History   Problem Relation Age of Onset    Cancer Father 79        Lung cancer    Colon Cancer Maternal Grandfather         Social History:  Social History     Socioeconomic History    Marital status:      Spouse name: Not on file    Number of children: Not on file    Years of education: Not on file    Highest education level: Not on file   Occupational History    Not on file   Tobacco Use    Smoking status: Never    Smokeless tobacco: Never   Vaping Use    Vaping status: Never Used   Substance and Sexual Activity    Alcohol use: Yes     Alcohol/week: 1.0 standard drink of alcohol     Types: 1 Glasses of wine per week     Comment: socailly    Drug use: Never    Sexual activity: Yes      Partners: Male   Other Topics Concern    Caffeine Concern Yes    Exercise Yes    Seat Belt Yes    Special Diet No    Stress Concern No    Weight Concern Yes     Comment: I lost 30 lbs. then gained back 20     Service Not Asked    Blood Transfusions Not Asked    Occupational Exposure Not Asked    Hobby Hazards Not Asked    Sleep Concern Not Asked    Back Care Not Asked    Bike Helmet Not Asked    Self-Exams Not Asked   Social History Narrative    The patient does not use an assistive device..      The patient does live in a home with stairs.     Social Determinants of Health     Financial Resource Strain: Not on file   Food Insecurity: Not on file   Transportation Needs: Not on file   Physical Activity: Not on file   Stress: Not on file   Social Connections: Unknown (3/13/2021)    [facility]    Social Connections     Conversations with friends/family/neighbors per week: Not on file   Housing Stability: Low Risk  (7/17/2021)    [facility]    Housing Stability     Mortgage Payment Concerns?: Not on file     Number of Places Lived in the Last Year: Not on file     Unstable Housing?: Not on file        Physical Examination:  Vitals:    09/19/24 1234   BP: 109/71   Pulse: 71   Resp: 18        General:  Alert and oriented x3  Affect:  NAD  Head:  Normocephalic, atraumatic  Eyes:  anicteric; no injection  CV:  Reg rate  Respiratory:  Non labored breathing on room air   Gait: non antalgic, cane user:  no  Spine:  Normal   Lumbar ROM: normal   Abdomen: soft; non distended; non rigid; active bowel sounds; tenderness to palpation present at the right upper quadrant right under the costal margin and right lower lateral ribcage  Carnett's test: positive for RUQ pain when abdomen flexed and when supine with legs elevated  Neurologic: normal motor strength and intact sensation upper and  lower extremities b/l          IMAGING:  PROCEDURE: US ABDOMEN LIMITED (CPT=76705)       COMPARISON:   None.       INDICATIONS:   G89.29 Chronic RLQ pain R10.31 Chronic RLQ pain R14.0 Abdominal bloating       TECHNIQUE:   Limited evaluation of the areas indicated in the order with gray scale and colorflow of the main vessels where appropriate.  Areas scanned:  Liver gallbladder pancreas and right kidney       FINDINGS:   LIVER:   Normal size, echotexture and color flow.  No masses or duct dilatation. Color flow and Doppler imaging of the portal and hepatic veins show patency and antegrade flow.   GALLBLADDER:   Cholecystectomy   BILE DUCTS:   Normal.  Common bile duct measures 2.9 mm.     PANCREAS:   Visualized portions are unremarkable.  Distal pancreatic tail obscured   OTHER:   No free fluid hepatorenal fossa. No hydronephrosis right kidney.       CONCLUSION:   1. Normal hepatic sonogram.  No biliary dilatation.   2. Cholecystectomy.  Normal caliber common duct.   3. No free fluid hepatorenal fossa. No hydronephrosis right kidney.   4. Visualized portions the pancreas unremarkable.  Distal pancreatic tail obscured               DICTATED BY (CST): XIOMARA LUU MD ON 6/21/2022 AT 11:59 AM       FINALIZED BY (CST): XIOMARA LUU MD ON 6/21/2022 AT 12:01 PM                ASSESSMENT AND PLAN:   48 year old female with hx of cholecystectomy who presented with recurrence of the right upper quadrant abdominal pain due to anterior cutaneous nerve entrapment (ACNES).     The patient previously underwent right sided  TAP block which provided 100% relief lasting for 1 year.       Recommendation:  -erector spinae plane block at right T10 and T11 level   - MRI cervical       Pt will return to clinic for follow up 2 weeks after the block    Time spent: 28 minutes         Inez Corey DO  Anesthesiology  Pain Medicine

## 2024-09-19 NOTE — PATIENT INSTRUCTIONS
Refill policies:    Allow 2-3 business days for refills; controlled substances may take longer.  Contact your pharmacy at least 5 days prior to running out of medication and have them send an electronic request or submit request through the “request refill” option in your [employer] account.  Refills are not addressed on weekends; covering physicians do not authorize routine medications on weekends.  No narcotics or controlled substances are refilled after noon on Fridays or by on call physicians.  By law, narcotics must be electronically prescribed.  A 30 day supply with no refills is the maximum allowed.  If your prescription is due for a refill, you may be due for a follow up appointment.  To best provide you care, patients receiving routine medications need to be seen at least once a year.  Patients receiving narcotic/controlled substance medications need to be seen at least once every 3 months.  In the event that your preferred pharmacy does not have the requested medication in stock (e.g. Backordered), it is your responsibility to find another pharmacy that has the requested medication available.  We will gladly send a new prescription to that pharmacy at your request.    Scheduling Tests:    If your physician has ordered radiology tests such as MRI or CT scans, please contact Central Scheduling at [phone] right away to schedule the test.  Once scheduled, the [facility] Centralized Referral Team will work with your insurance carrier to obtain pre-certification or prior authorization.  Depending on your insurance carrier, approval may take 3-10 days.  It is highly recommended patients assure they have received an authorization before having a test performed.  If test is done without insurance authorization, patient may be responsible for the entire amount billed.      Precertification and Prior Authorizations:  If your physician has recommended that you have a procedure or additional testing performed the [facility]  Centralized Referral Team will contact your insurance carrier to obtain pre-certification or prior authorization.    You are strongly encouraged to contact your insurance carrier to verify that your procedure/test has been approved and is a COVERED benefit.  Although the [facility] Centralized Referral Team does its due diligence, the insurance carrier gives the disclaimer that \"Although the procedure is authorized, this does not guarantee payment.\"    Ultimately the patient is responsible for payment.   Thank you for your understanding in this matter.  Paperwork Completion:  If you require FMLA or disability paperwork for your recovery, please make sure to either drop it off or have it faxed to our office at [phone]. Be sure the form has your name and date of birth on it.  The form will be faxed to our Forms Department and they will complete it for you.  There is a 25$ fee for all forms that need to be filled out.  Please be aware there is a 10-14 day turnaround time.  You will need to sign a release of information (BLADIMIR) form if your paperwork does not come with one.  You may call the Forms Department with any questions at [phone].  Their fax number is [phone].

## 2024-09-19 NOTE — PROGRESS NOTES
Patient presents in office today with reported pain in R elbow/hand/shoulder area    Current pain level reported = 4/10    Last reported dose of Cetraline, allertec, sudafed, ibuprofen      Narcotic Contract renewal n/a    Urine Drug screen n/a

## 2024-09-25 ENCOUNTER — TELEPHONE (OUTPATIENT)
Dept: PAIN CLINIC | Facility: HOSPITAL | Age: 49
End: 2024-09-25

## 2024-09-25 DIAGNOSIS — G58.8 ENTRAPMENT SYNDROME OF CUTANEOUS NERVE OF ABDOMEN: Primary | ICD-10-CM

## 2024-09-25 DIAGNOSIS — R10.9 ABDOMINAL PAIN, UNSPECIFIED ABDOMINAL LOCATION: ICD-10-CM

## 2024-09-25 NOTE — TELEPHONE ENCOUNTER
Prior authorization request completed for: Right Erector Spinae Plane Block   Authorization #No Prior Authorization Required.   Pre-D: Not Required -based on medical necessity.   Exclusions/Restrictions: -based on medical necessity.   Covered Benefit: -based on medical necessity.   Authorization dates: n/a  CPT codes approved: 90339  Number of visits/dates of service approved: 1  Physician: Leora  Location: [facility]     Call Ref#: [id number]  Representative Name: Alicia HIGGINS  Insurance Carrier: [employer] (phone #: [phone])   Total Call Time : 19:04    Patient can be scheduled. Routed to Navigator.

## 2024-09-30 DIAGNOSIS — F41.9 ANXIETY: ICD-10-CM

## 2024-09-30 DIAGNOSIS — E66.3 OVERWEIGHT (BMI 25.0-29.9): ICD-10-CM

## 2024-10-02 RX ORDER — TIRZEPATIDE 10 MG/.5ML
1 INJECTION, SOLUTION SUBCUTANEOUS
Qty: 2 ML | Refills: 0 | Status: SHIPPED | OUTPATIENT
Start: 2024-10-02

## 2024-10-02 NOTE — TELEPHONE ENCOUNTER
Please review. Protocol Failed; No Protocol    Requested Prescriptions   Pending Prescriptions Disp Refills    ZEPBOUND 10 MG/0.5ML Subcutaneous Solution Auto-injector [Pharmacy Med Name: Zepbound Subcutaneous Solution Auto-injector 10 MG/0.5ML] 2 mL 0     Sig: INJECT 1 PEN UNDER THE SKIN  EVERY 7 DAYS       There is no refill protocol information for this order            Future Appointments         Provider Department Appt Notes    In 1 week Lisa Reeves DO [facility]     In 4 weeks [facility] MRI MOBILE (1.5T) [facility]           Recent Outpatient Visits              1 week ago Cervical radiculopathy    [facility] Inez Corey DO    Office Visit    3 months ago Right lateral epicondylitis    [facility] Lisa Reeves DO    Office Visit    3 months ago Abdominal pain, unspecified abdominal location    [facility] Inez Corey DO    Office Visit    10 months ago TMJ (temporomandibular joint disorder)    [facility]    [facility]    11 months ago Other infective acute otitis externa of right ear    [facility] Makayla Hendrickson MD    Office Visit

## 2024-10-09 ENCOUNTER — OFFICE VISIT (OUTPATIENT)
Facility: CLINIC | Age: 49
End: 2024-10-09
Payer: COMMERCIAL

## 2024-10-09 ENCOUNTER — LAB ENCOUNTER (OUTPATIENT)
Dept: LAB | Facility: REFERENCE LAB | Age: 49
End: 2024-10-09
Attending: FAMILY MEDICINE
Payer: COMMERCIAL

## 2024-10-09 VITALS
HEART RATE: 70 BPM | OXYGEN SATURATION: 98 % | HEIGHT: 67 IN | DIASTOLIC BLOOD PRESSURE: 74 MMHG | SYSTOLIC BLOOD PRESSURE: 122 MMHG | WEIGHT: 137 LBS | BODY MASS INDEX: 21.5 KG/M2

## 2024-10-09 DIAGNOSIS — F90.0 ATTENTION DEFICIT HYPERACTIVITY DISORDER (ADHD), PREDOMINANTLY INATTENTIVE TYPE: ICD-10-CM

## 2024-10-09 DIAGNOSIS — Z00.00 ENCOUNTER FOR ROUTINE ADULT HEALTH EXAMINATION WITHOUT ABNORMAL FINDINGS: Primary | ICD-10-CM

## 2024-10-09 DIAGNOSIS — Z00.00 ENCOUNTER FOR ROUTINE ADULT HEALTH EXAMINATION WITHOUT ABNORMAL FINDINGS: ICD-10-CM

## 2024-10-09 DIAGNOSIS — F41.9 ANXIETY: ICD-10-CM

## 2024-10-09 LAB
ALBUMIN SERPL-MCNC: 4.9 G/DL (ref 3.2–4.8)
ALBUMIN/GLOB SERPL: 1.9 {RATIO} (ref 1–2)
ALP LIVER SERPL-CCNC: 46 U/L
ALT SERPL-CCNC: 15 U/L
ANION GAP SERPL CALC-SCNC: 4 MMOL/L (ref 0–18)
AST SERPL-CCNC: 21 U/L (ref ?–34)
BASOPHILS # BLD AUTO: 0.07 X10(3) UL (ref 0–0.2)
BASOPHILS NFR BLD AUTO: 1.1 %
BILIRUB SERPL-MCNC: 0.5 MG/DL (ref 0.3–1.2)
BUN BLD-MCNC: 15 MG/DL (ref 9–23)
BUN/CREAT SERPL: 21.1 (ref 10–20)
CALCIUM BLD-MCNC: 9.3 MG/DL (ref 8.7–10.4)
CHLORIDE SERPL-SCNC: 108 MMOL/L (ref 98–112)
CHOLEST SERPL-MCNC: 152 MG/DL (ref ?–200)
CO2 SERPL-SCNC: 28 MMOL/L (ref 21–32)
CREAT BLD-MCNC: 0.71 MG/DL
DEPRECATED RDW RBC AUTO: 41.2 FL (ref 35.1–46.3)
EGFRCR SERPLBLD CKD-EPI 2021: 104 ML/MIN/1.73M2 (ref 60–?)
EOSINOPHIL # BLD AUTO: 0.17 X10(3) UL (ref 0–0.7)
EOSINOPHIL NFR BLD AUTO: 2.8 %
ERYTHROCYTE [DISTWIDTH] IN BLOOD BY AUTOMATED COUNT: 11.3 % (ref 11–15)
EST. AVERAGE GLUCOSE BLD GHB EST-MCNC: 94 MG/DL (ref 68–126)
FASTING PATIENT LIPID ANSWER: YES
FASTING STATUS PATIENT QL REPORTED: YES
GLOBULIN PLAS-MCNC: 2.6 G/DL (ref 2–3.5)
GLUCOSE BLD-MCNC: 87 MG/DL (ref 70–99)
HBA1C MFR BLD: 4.9 % (ref ?–5.7)
HCT VFR BLD AUTO: 37 %
HDLC SERPL-MCNC: 74 MG/DL (ref 40–59)
HGB BLD-MCNC: 13.1 G/DL
IMM GRANULOCYTES # BLD AUTO: 0.01 X10(3) UL (ref 0–1)
IMM GRANULOCYTES NFR BLD: 0.2 %
LDLC SERPL CALC-MCNC: 66 MG/DL (ref ?–100)
LYMPHOCYTES # BLD AUTO: 1.96 X10(3) UL (ref 1–4)
LYMPHOCYTES NFR BLD AUTO: 31.9 %
MCH RBC QN AUTO: 34.9 PG (ref 26–34)
MCHC RBC AUTO-ENTMCNC: 35.4 G/DL (ref 31–37)
MCV RBC AUTO: 98.7 FL
MONOCYTES # BLD AUTO: 0.37 X10(3) UL (ref 0.1–1)
MONOCYTES NFR BLD AUTO: 6 %
NEUTROPHILS # BLD AUTO: 3.57 X10 (3) UL (ref 1.5–7.7)
NEUTROPHILS # BLD AUTO: 3.57 X10(3) UL (ref 1.5–7.7)
NEUTROPHILS NFR BLD AUTO: 58 %
NONHDLC SERPL-MCNC: 78 MG/DL (ref ?–130)
OSMOLALITY SERPL CALC.SUM OF ELEC: 290 MOSM/KG (ref 275–295)
PLATELET # BLD AUTO: 252 10(3)UL (ref 150–450)
POTASSIUM SERPL-SCNC: 4.1 MMOL/L (ref 3.5–5.1)
PROT SERPL-MCNC: 7.5 G/DL (ref 5.7–8.2)
RBC # BLD AUTO: 3.75 X10(6)UL
SODIUM SERPL-SCNC: 140 MMOL/L (ref 136–145)
TRIGL SERPL-MCNC: 58 MG/DL (ref 30–149)
VLDLC SERPL CALC-MCNC: 9 MG/DL (ref 0–30)
WBC # BLD AUTO: 6.2 X10(3) UL (ref 4–11)

## 2024-10-09 PROCEDURE — 80061 LIPID PANEL: CPT

## 2024-10-09 PROCEDURE — 36415 COLL VENOUS BLD VENIPUNCTURE: CPT

## 2024-10-09 PROCEDURE — 99396 PREV VISIT EST AGE 40-64: CPT | Performed by: FAMILY MEDICINE

## 2024-10-09 PROCEDURE — 85025 COMPLETE CBC W/AUTO DIFF WBC: CPT

## 2024-10-09 PROCEDURE — 83036 HEMOGLOBIN GLYCOSYLATED A1C: CPT

## 2024-10-09 PROCEDURE — 99213 OFFICE O/P EST LOW 20 MIN: CPT | Performed by: FAMILY MEDICINE

## 2024-10-09 PROCEDURE — 80053 COMPREHEN METABOLIC PANEL: CPT

## 2024-10-09 RX ORDER — LISDEXAMFETAMINE DIMESYLATE 30 MG/1
30 CAPSULE ORAL EVERY MORNING
Qty: 30 CAPSULE | Refills: 0 | Status: SHIPPED | OUTPATIENT
Start: 2024-10-09

## 2024-10-09 NOTE — PROGRESS NOTES
HPI:   Tyesha Chowdhury is a 48 year old female who presents for a complete physical exam.     Her side pain is almost completely gone after her last injection in July, and plans one more at the end of the month.  Has been having elbow pain since June, which she thinks is tennis elbow.   Got stung by a bee on Saturday in her left finger, and her whole hand got very swollen. She took Benadryl and applied cortisone cream, which did help. Only had isolated swelling in that area.   Has continued with HRT through her gynecologist, and it has helped tremendously with her menopausal symptoms.   Will take Vyvanse only a few days a week, and it does help with her focus. Sertraline really helps with her anxiety as well.   Has not donated blood recently to see if it helps with her hemoglobin.     Last pap: 10/2022 and normal   Last mammogram: 6/2024 and normal   Menses: None for many years.    Previous colonoscopy: 1/2021 and normal    History of STD's: None  History of intimate partner violence: None  Family hx of breast, ovarian, cervical or colon CA: MGF with colon cancer   Diet and exercise: Plays tennis and paddle once a week, and also goes to [employer] and yoga. Taking Zepbound every two weeks for maintenance. Only has coffee in the morning, but cut out the creamer. Will have a sandwich on an English muffin with cottage cheese and an apple for lunch. Dinner is a pork chop with potatoes or other vegetables. Has a protein shake in the morning as well.  Needs to drink more water.   Immunizations:  Tdap: 2023, Flu: Declines, Shingles: Declines, Covid: Completed 2 doses    REVIEW OF SYSTEMS:   GENERAL: feels well otherwise   SKIN: denies any unusual skin lesions  EYES: no vision problems  BREAST: no lumps or masses, no nipple discharge   LUNGS: denies shortness of breath  CARDIOVASCULAR: denies chest pain  GI: denies abdominal pain,  Intermittent constipation or diarrhea, no hematochezia  : denies dysuria, vaginal  discharge or itching  NEURO: denies headaches  PSYCHE: denies depression and stable anxiety          Current Outpatient Medications   Medication Sig Dispense Refill    lisdexamfetamine (VYVANSE) 30 MG Oral Cap Take 1 capsule (30 mg total) by mouth every morning. 30 capsule 0    Tirzepatide-Weight Management (ZEPBOUND) 10 MG/0.5ML Subcutaneous Solution Auto-injector Inject 1 Pen into the skin every 7 days. 2 mL 0    sertraline 50 MG Oral Tab Take 1 tablet (50 mg total) by mouth at bedtime. 90 tablet 3    BIJUVA 1-100 MG Oral Cap Take 1 capsule by mouth daily.       Allergies[1]   Past Medical History:    Allergic rhinitis    Anxiety    Esophageal reflux    Shingles      Past Surgical History:   Procedure Laterality Date    Cholecystectomy  2021    Colonoscopy      Normal    Colonoscopy  2021    Normal     Colonoscopy      [facility]      08, 1/19/10, 3/1/13      Family History   Problem Relation Age of Onset    Cancer Father 79        Lung cancer    Colon Cancer Maternal Grandfather       Social History:   Social History     Socioeconomic History    Marital status:    Tobacco Use    Smoking status: Never    Smokeless tobacco: Never   Vaping Use    Vaping status: Never Used   Substance and Sexual Activity    Alcohol use: Yes     Alcohol/week: 1.0 standard drink of alcohol     Types: 1 Glasses of wine per week     Comment: socailly    Drug use: Never    Sexual activity: Yes     Partners: Male   Other Topics Concern    Caffeine Concern Yes    Exercise Yes    Seat Belt Yes    Special Diet No    Stress Concern No    Weight Concern Yes     Comment: I lost 30 lbs. then gained back 20   Social History Narrative    The patient does not use an assistive device..      The patient does live in a home with stairs.     Social Drivers of Health      [facility]    Social Connections    Received from [facility],  [facility]    Housing Stability     Occ: Advertising sales for streaming industry. : Yes. Children: 3.         EXAM:     Wt Readings from Last 6 Encounters:   10/09/24 137 lb (62.1 kg)   09/19/24 135 lb (61.2 kg)   07/03/24 140 lb (63.5 kg)   06/18/24 140 lb (63.5 kg)   06/06/24 156 lb (70.8 kg)   11/28/23 156 lb (70.8 kg)     Body mass index is 21.46 kg/m².   /74   Pulse 70   Ht 5' 7\" (1.702 m)   Wt 137 lb (62.1 kg)   SpO2 98%   BMI 21.46 kg/m²     GENERAL: well developed, well nourished, in no apparent distress   SKIN: no rashes, no suspicious lesions  HEENT: atraumatic, normocephalic, throat clear; normal dentition  EYES: PERRLA, EOMI, conjunctiva are clear  NECK: supple, no adenopathy or thyroid masses   BREAST: no dominant or suspicious mass, no nipple discharge  LUNGS: clear to auscultation  CARDIO: RRR without murmur  GI: good bowel sounds, no masses, HSM or tenderness  : deferred, not due for pap smear and no concerns   EXTREMITIES: no edema    Cholesterol, Total (mg/dL)   Date Value   10/24/2023 183   09/26/2022 179   03/13/2020 195     HDL Cholesterol (mg/dL)   Date Value   10/24/2023 95 (H)   09/26/2022 75 (H)   03/13/2020 91 (H)     LDL Cholesterol (mg/dL)   Date Value   10/24/2023 72   09/26/2022 86   03/13/2020 93      ASSESSMENT AND PLAN:   Tyesha Chowdhury is a 48 year old female who presents for a complete physical exam.  Encounter Diagnoses   Name Primary?    Encounter for routine adult health examination without abnormal findings Yes    Attention deficit hyperactivity disorder (ADHD), predominantly inattentive type     Anxiety      Orders Placed This Encounter   Procedures    CBC With Differential With Platelet    Comp Metabolic Panel (14)    Hemoglobin A1C    Lipid Panel     Refill of Vyvanse sent for ADHD, which has been helping with her focus through work.  Continue sertraline 50 mg daily for anxiety, which is well-controlled.  Has continued to  maintain her weight loss with Zepbound 10 mg weekly, and notify me when needing her refill.    Discussed with patient the following:  -Monthly breast self-exam  -Breast cancer screening/mammograms and clinical breast exams  -Cervical cancer screening/pap smears  -Colon cancer screening/colonoscopy  -Adequate calcium and Vitamin D intake to prevent osteoporosis  -Bone density screening for osteopenia/osteoporosis  -Healthy diet including adequate intake of vegetables and fruits, appropriate portion sizes, minimizing highly concentrated carbohydrate foods  -Exercising 30 minutes a day most days of the week   -Diabetes screening which she desires  -Cholesterol screening which she desires  -Recommendation for yearly influenza vaccine  -Need for Tdap once as an adult and Td booster every 10 years  -Need for Zoster vaccine for patients >= 50  -STI screening (GC/Chlamydia/HIV): Not indicated  -Hepatitis C screening for all adults between the ages of 18 and 79: Checked and negative     All questions were answered during the visit and the patient verbalizes understanding. She will return in one year for next WWE or sooner as needed.    Meds & Refills for this Visit:  Requested Prescriptions     Signed Prescriptions Disp Refills    lisdexamfetamine (VYVANSE) 30 MG Oral Cap 30 capsule 0     Sig: Take 1 capsule (30 mg total) by mouth every morning.       Imaging & Consults:  None    Lisa Reeves DO  10/9/2024  11:10 AM         [1] No Known Allergies

## 2024-10-21 RX ORDER — MONTELUKAST SODIUM 4 MG/1
4 TABLET, CHEWABLE ORAL ONCE
COMMUNITY

## 2024-10-21 NOTE — DISCHARGE INSTRUCTIONS
HOME INSTRUCTIONS    POST PROCEDURE CARE AND INFECTION PREVENTION:    1.   Your dressing should be removed within 24 hours.  If it falls off before that time, you need not reapply.    2.   You may shower tomorrow.    3.   If necessary, you may apply ice to the injection site for 20 minute intervals to help alleviate any localized discomfort.    4.  The [facility] office number is [phone].  Call and report the following conditions to the [facility]:   -Any excessive bleeding, swelling, or pain at the injection site.   -Any temperature greater than 101 degrees.   -Any excessive itch or rash.   -Any change in your bowel or bladder habits.   -Any increased numbness, tingling, or weakness to the extremities.   -Any persistent, severe headache (especially a headache aggravated by being in   An upright position.  The office is open between the hours of 7:30 am - 2:00pm.  After hours you may leave a message and the nurse will return your call during normal office hours.    5.  Please call the [facility] in one week to schedule an appointment for your follow up visit unless instructed differently by your doctor.  If you need to cancel or reschedule any appointment, please call as soon as possible.    6.  You may return to school or work per your doctor's instructions.    7.  Wash your hands before and after touching your dressing.  Be sure to rub your hands together with warm water and soap for 20 seconds or longer when washing.        MEDICATION:    1.  You may resume all your usual medications unless instructed otherwise by your doctor.    2.  To alleviate pain, you may take your over the counter or prescription pain medications as per the label instructions.    3.  Se Medication Reconciliation form for any new prescriptions.    4.  Do not drive, operate heavy machinery, or drink alcoholic beverages while taking narcotic pain medication.  Narcotic pain medication may  cause constipation.  If no bowel movement in 48 hours, please contact your physician.        IN CASE OF EMERGENCY:  If you are unable to reach your doctor, call or go to the nearest emergency room.  The [facility] Emergency Department's phone number is [phone].      INSTRUCTIONS FOR PATIENT WITH GENERAL/IV SEDATION ONLY:  1.  Begin clear liquids and bland foods then progress to your normal diet if you are not nauseated.    2.  Nausea and vomiting occasionally occur after surgery. If you are nauseated, remain on clear liquids until it passes.  If nausea persists longer than 24 hours, please notify your doctor.      3.  Rest on the day of surgery.  You may increase activity as tolerated starting tomorrow.    4. Do not drive, operate hazardous machinery, or make important personal or legal decisions for 24 hours.    5.  You may feel dizzy or lightheaded from anesthesia.  Move cautiously for 24 hours.            AMBSURG HOME CARE INSTRUCTIONS: POST-OP ANESTHESIA  The medication that you received for sedation or general anesthesia can last up to 24 hours. Your judgment and reflexes may be altered, even if you feel like your normal self.      We Recommend:   Do not drive any motor vehicle or bicycle   Avoid mowing the lawn, playing sports, or working with power tools/applicances (power saws, electric knives or mixers)   That you have someone stay with you on your first night home   Do not drink alcohol or take sleeping pills or tranquilizers   Do not sign legal documents within 24 hours of your procedure   If you had a nerve block for your surgery, take extra care not to put any pressure on your arm or hand for 24 hours    It is normal:  For you to have a sore throat if you had a breathing tube during surgery (while you were asleep!). The sore throat should get better within 48 hours. You can gargle with warm salt water (1/2 tsp in 4 oz warm water) or use a throat lozenge for comfort  To feel muscle  aches or soreness especially in the abdomen, chest or neck. The achy feeling should go away in the next 24 hours  To feel weak, sleepy or \"wiped out\". Your should start feeling better in the next 24 hours.   To experience mild discomforts such as sore lip or tongue, headache, cramps, gas pains or a bloated feeling in your abdomen.   To experience mild back pain or soreness for a day or two if you had spinal or epidural anesthesia.   If you had laparoscopic surgery, to feel shoulder pain or discomfort on the day of surgery.   For some patients to have nausea after surgery/anesthesia    If you feel nausea or experience vomiting:   Try to move around less.   Eat less than usual or drink only liquids until the next morning   Nausea should resolve in about 24 hours    If you have a problem when you are at home:    Call your surgeons office   Discharge Instructions: After Your Surgery  You’ve just had surgery. During surgery, you were given medicine called anesthesia to keep you relaxed and free of pain. After surgery, you may have some pain or nausea. This is common. Here are some tips for feeling better and getting well after surgery.   Going home  Your healthcare provider will show you how to take care of yourself when you go home. They'll also answer your questions. Have an adult family member or friend drive you home. For the first 24 hours after your surgery:   Don't drive or use heavy equipment.  Don't make important decisions or sign legal papers.  Take medicines as directed.  Don't drink alcohol.  Have someone stay with you, if needed. They can watch for problems and help keep you safe.  Be sure to go to all follow-up visits with your healthcare provider. And rest after your surgery for as long as your provider tells you to.   Coping with pain  If you have pain after surgery, pain medicine will help you feel better. Take it as directed, before pain becomes severe. Also, ask your healthcare provider or pharmacist  about other ways to control pain. This might be with heat, ice, or relaxation. And follow any other instructions your surgeon or nurse gives you.      Stay on schedule with your medicine.     Tips for taking pain medicine  To get the best relief possible, remember these points:   Pain medicines can upset your stomach. Taking them with a little food may help.  Most pain relievers taken by mouth need at least 20 to 30 minutes to start to work.  Don't wait till your pain becomes severe before you take your medicine. Try to time your medicine so that you can take it before starting an activity. This might be before you get dressed, go for a walk, or sit down for dinner.  Constipation is a common side effect of some pain medicines. Call your healthcare provider before taking any medicines such as laxatives or stool softeners to help ease constipation. Also ask if you should skip any foods. Drinking lots of fluids and eating foods such as fruits and vegetables that are high in fiber can also help. Remember, don't take laxatives unless your surgeon has prescribed them.  Drinking alcohol and taking pain medicine can cause dizziness and slow your breathing. It can even be deadly. Don't drink alcohol while taking pain medicine.  Pain medicine can make you react more slowly to things. Don't drive or run machinery while taking pain medicine.  Your healthcare provider may tell you to take acetaminophen to help ease your pain. Ask them how much you're supposed to take each day. Acetaminophen or other pain relievers may interact with your prescription medicines or other over-the-counter (OTC) medicines. Some prescription medicines have acetaminophen and other ingredients in them. Using both prescription and OTC acetaminophen for pain can cause you to accidentally overdose. Read the labels on your OTC medicines with care. This will help you to clearly know the list of ingredients, how much to take, and any warnings. It may also  help you not take too much acetaminophen. If you have questions or don't understand the information, ask your pharmacist or healthcare provider to explain it to you before you take the OTC medicine.   Managing nausea  Some people have an upset stomach (nausea) after surgery. This is often because of anesthesia, pain, or pain medicine, less movement of food in the stomach, or the stress of surgery. These tips will help you handle nausea and eat healthy foods as you get better. If you were on a special food plan before surgery, ask your healthcare provider if you should follow it while you get better. Check with your provider on how your eating should progress. It may depend on the surgery you had. These general tips may help:   Don't push yourself to eat. Your body will tell you when to eat and how much.  Start off with clear liquids and soup. They're easier to digest.  Next try semi-solid foods as you feel ready. These include mashed potatoes, applesauce, and gelatin.  Slowly move to solid foods. Don’t eat fatty, rich, or spicy foods at first.  Don't force yourself to have 3 large meals a day. Instead eat smaller amounts more often.  Take pain medicines with a small amount of solid food, such as crackers or toast. This helps prevent nausea.  When to call your healthcare provider  Call your healthcare provider right away if you have any of these:   You still have too much pain, or the pain gets worse, after taking the medicine. The medicine may not be strong enough. Or there may be a complication from the surgery.  You feel too sleepy, dizzy, or groggy. The medicine may be too strong.  Side effects such as nausea or vomiting. Your healthcare provider may advise taking other medicines to .  Skin changes such as rash, itching, or hives. This may mean you have an allergic reaction. Your provider may advise taking other medicines.  The incision looks different (for instance, part of it opens up).  Bleeding or fluid  leaking from the incision site, and weren't told to expect that.  Fever of 100.4°F (38°C) or higher, or as directed by your provider.  Call 911  Call 911 right away if you have:   Trouble breathing  Facial swelling    If you have obstructive sleep apnea   You were given anesthesia medicine during surgery to keep you comfortable and free of pain. After surgery, you may have more apnea spells because of this medicine and other medicines you were given. The spells may last longer than normal.    At home:  Keep using the continuous positive airway pressure (CPAP) device when you sleep. Unless your healthcare provider tells you not to, use it when you sleep, day or night. CPAP is a common device used to treat obstructive sleep apnea.  Talk with your provider before taking any pain medicine, muscle relaxants, or sedatives. Your provider will tell you about the possible dangers of taking these medicines.  Contact your provider if your sleeping changes a lot even when taking medicines as directed.  [employer] last reviewed this educational content on 10/1/2021  © 7347-5928 [employer]. All rights reserved. This information is not intended as a substitute for professional medical care. Always follow your healthcare professional's instructions.

## 2024-10-23 ENCOUNTER — ANESTHESIA EVENT (OUTPATIENT)
Dept: SURGERY | Facility: HOSPITAL | Age: 49
End: 2024-10-23
Payer: COMMERCIAL

## 2024-10-23 ENCOUNTER — ANESTHESIA (OUTPATIENT)
Dept: SURGERY | Facility: HOSPITAL | Age: 49
End: 2024-10-23
Payer: COMMERCIAL

## 2024-10-23 ENCOUNTER — HOSPITAL ENCOUNTER (OUTPATIENT)
Facility: HOSPITAL | Age: 49
Setting detail: HOSPITAL OUTPATIENT SURGERY
Discharge: HOME | End: 2024-10-23
Attending: ANESTHESIOLOGY | Admitting: ANESTHESIOLOGY
Payer: COMMERCIAL

## 2024-10-23 ENCOUNTER — APPOINTMENT (OUTPATIENT)
Dept: GENERAL RADIOLOGY | Facility: HOSPITAL | Age: 49
End: 2024-10-23
Attending: ANESTHESIOLOGY
Payer: COMMERCIAL

## 2024-10-23 VITALS
DIASTOLIC BLOOD PRESSURE: 75 MMHG | HEART RATE: 67 BPM | RESPIRATION RATE: 16 BRPM | TEMPERATURE: 97 F | OXYGEN SATURATION: 100 % | SYSTOLIC BLOOD PRESSURE: 115 MMHG

## 2024-10-23 PROCEDURE — 3E0R33Z INTRODUCTION OF ANTI-INFLAMMATORY INTO SPINAL CANAL, PERCUTANEOUS APPROACH: ICD-10-PCS | Performed by: ANESTHESIOLOGY

## 2024-10-23 PROCEDURE — 3E0R3BZ INTRODUCTION OF ANESTHETIC AGENT INTO SPINAL CANAL, PERCUTANEOUS APPROACH: ICD-10-PCS | Performed by: ANESTHESIOLOGY

## 2024-10-23 RX ORDER — IOPAMIDOL 408 MG/ML
INJECTION, SOLUTION INTRATHECAL AS NEEDED
Status: DISCONTINUED | OUTPATIENT
Start: 2024-10-23 | End: 2024-10-23 | Stop reason: HOSPADM

## 2024-10-23 RX ORDER — LIDOCAINE HYDROCHLORIDE 10 MG/ML
INJECTION, SOLUTION EPIDURAL; INFILTRATION; INTRACAUDAL; PERINEURAL AS NEEDED
Status: DISCONTINUED | OUTPATIENT
Start: 2024-10-23 | End: 2024-10-23 | Stop reason: HOSPADM

## 2024-10-23 RX ORDER — DEXAMETHASONE SODIUM PHOSPHATE 10 MG/ML
INJECTION, SOLUTION INTRAMUSCULAR; INTRAVENOUS AS NEEDED
Status: DISCONTINUED | OUTPATIENT
Start: 2024-10-23 | End: 2024-10-23 | Stop reason: HOSPADM

## 2024-10-23 RX ORDER — BUPIVACAINE HYDROCHLORIDE 2.5 MG/ML
INJECTION, SOLUTION EPIDURAL; INFILTRATION; INTRACAUDAL AS NEEDED
Status: DISCONTINUED | OUTPATIENT
Start: 2024-10-23 | End: 2024-10-23 | Stop reason: HOSPADM

## 2024-10-23 NOTE — BRIEF OP NOTE
OPERATIVE NOTE     Date of Service: 10/23/2024     Procedure Performed:   Fluoroscopy Guided Erector Spinae Plane Block at T10 and T11 Levels, Right Side     Pre-Procedure Diagnosis: Abdominal wall pain, Postoperative Pain     Post-Procedure Diagnosis: Abdominal wall pain, Postoperative Pain      Surgeon: Inez Corey DO      Anesthesia: Local     Indication: Right sided upper abdominal pain which started after cholecystectomy.   The patient's chart was reviewed. List of medications, history of allergic reactions and absence or appropriate holding of blood thinning/anticoagulant agents was verified. There was no changes in the patient's medical history, symptoms, and physical status noted from the time of the last office visit. Details of the procedure, along with potential risks related to the procedure, benefits of the procedure and alternative treatment options were discussed in depth with the patient and her spouse. All questions were answered to the patient's satisfaction. Written informed consent was signed.      Procedure technique: The patient was brought into the operating room and was placed on the operating room in prone position. Anesthesia monitors were connected. The patient remained awake and responsive throughout the procedure.   Time out was called out with patient identifier, correct side of the injection, and location of the injection. Routine monitors were connected to the patient.      The thoracic back area was widely disinfected with chlorhexidine and covered with sterile towels in the usual sterile manner. Sterile technique was observed throughout the procedure.   With fluoroscopic AP view, correct thoracic levels were identified and marked. With small ipsilateral oblique tilt the needle placement site was marked at junction of lower border of the rib and corresponding transverse process.   The injection sites were infiltrated with 1% lidocaine.   Next, 25-gauge 3.5 inch spinal  needle was introduced and advanced until contact was made with the transverse process of T11 vertebral body. The needle was withdrawn by 2mm. The remaining needle was placed in a similar manner at the T10 level.   After negative aspiration for blood, fluid and air, 2ml of contrast dye was injected at each level.  Appropriate spread of the dye was confirmed in the AP and lateral view.   After negative aspiration for blood, fluid and air, 10ml of  injectate consisting of 6mg dexamethasone mixed with 0.25% bupivacaine was injected at each level.   Needles were removed. The procedure was repeated in similar manner on the opposite side without difficulties or complications. Throughout the procedure the patient's vital signs remained normal.   There were no signs of local anesthetic toxicity noted.       The patient was monitored  in the recovery unit  after which was discharged home in stable ambulatory condition. Discharge post-procedure instructions were provided and the patient will follow up with Pain Clinic 2 weeks post the injection.            Inez Corey DO  Anesthesiology  Pain Medicine

## 2024-10-23 NOTE — H&P
[facility]    Tyesha Chowdhury Patient Status:  Hospital Outpatient Surgery    11/15/1975 MRN [id number]   Location [facility] PRE OP RECOVERY Attending Inez Corey,    Hosp Day # 0 PCP Lisa Reeves DO       HPI:  Tyesha Chowdhury is a 48 year old female with right sided abdominal pain. She had previously done TAP block with 100% pain relief lasting for more than 1 year.       Allergies: No Known Allergies    Past Medical History:    Allergic rhinitis    Anxiety    Esophageal reflux    Shingles     Current Medications:         Current Outpatient Medications   Medication Sig Dispense Refill    Tirzepatide (MOUNJARO) 7.5 MG/0.5ML Subcutaneous Solution Pen-injector Inject 7.5 mg into the skin once a week. 6 mL 0    lisdexamfetamine (VYVANSE) 20 MG Oral Cap Take 1 capsule (20 mg total) by mouth every morning. 30 capsule 0    sertraline 50 MG Oral Tab Take 1 tablet (50 mg total) by mouth every evening. 90 tablet 3      Review of Systems  Bowel/Bladder Incontinence:  As above  Numbness/tingling:  As above  Weakness:  As above  Weight Loss:  Negative  Fever:  Negative  Cardiovascular:  No current chest pain or palpitations  Respiratory:  No current shortness of breath  Gastrointestinal:  No active ulcer  Genitourinary:  Negative  Integumentary:  Negative  Psychiatric:  Negative  Hematologic:  No active bleeding  Lymphatic:  No current lymphedema  Allergic/Immunologic:  Negative  Musculoskeletal:  As above  Neurological:  As above  Denies chest pain, shortness of breath.    Physical Exam  Blood pressure 116/80, pulse 70, temperature 97.3 °F (36.3 °C), temperature source Oral, resp. rate 16, SpO2 100%, not currently breastfeeding.    General:  Alert and oriented x3  Affect:  NAD  Head:  Normocephalic, atraumatic  Eyes:  anicteric; no injection  CV:  Reg rate  Respiratory:  Non labored breathing on room air   Gait: non antalgic, cane user:  no  Spine:  Normal   Lumbar ROM: normal    Neuro: no focal sensory or motor deficits   Abdomen: tenderness to palpation right abdomen      Assessment:  48 year old female with complaint of recurrent right sided abdominal pain due to ACNES         Plan:  Right sided ERPB block with fluoroscopy at R T10-T11 level     Follow up in 1-2 week     Inez Corey DO  Anesthesiology  Pain Medicine

## 2024-10-30 ENCOUNTER — TELEPHONE (OUTPATIENT)
Dept: CASE MANAGEMENT | Age: 49
End: 2024-10-30

## 2024-10-30 NOTE — TELEPHONE ENCOUNTER
Hello     We have received correspondence regarding the request for MRI spine cervical. The health plan has denied this request. The patient is scheduled for 10.31.2024.    Clinical rationale:     Clinical history and indications submitted fail to meet medical necessity criteria    You can complete a peer to peer   Ph. [phone]  Case # [id number]    Please advise next steps in plan of care.     Thank you,  Herrick  Referral specialist

## 2024-11-04 DIAGNOSIS — F41.9 ANXIETY: ICD-10-CM

## 2024-11-04 DIAGNOSIS — E66.3 OVERWEIGHT (BMI 25.0-29.9): ICD-10-CM

## 2024-11-05 NOTE — TELEPHONE ENCOUNTER
Thank you for contacting [facility] to schedule a Peer to Peer conversation. Below are the details related to your scheduled appointment.    Case Number: [id number]    Appointment Date: 11/06/2024    Appointment Time: 12:15 PM /Central    Name of Provider on Case: Inez Corey MD     Name: Jessica Styles    Phone Number for P2P: [phone] ext. office    Alternate Contact Number: [phone] ext. office- direct line    Contact Instructions:    Level of Review: Reconsideration P2P

## 2024-11-06 NOTE — TELEPHONE ENCOUNTER
P2P Outcome:     Denied.    No PT for 6 months, Pt written by Dr Reeves is not enough. PT to be ordered for Cervical and upper strength.       Pt called and made aware that she has a PT referral and to make appt. Pt wants to know if we can add on PT for elbow as well. Let her know I'd forward this over to MARCO ANTONIO Osorio. Pt verbalized understanding.

## 2024-11-07 RX ORDER — TIRZEPATIDE 10 MG/.5ML
1 INJECTION, SOLUTION SUBCUTANEOUS
Qty: 6 ML | Refills: 3 | Status: SHIPPED | OUTPATIENT
Start: 2024-11-07

## 2024-11-07 NOTE — TELEPHONE ENCOUNTER
Routing to pod mate provider due to High Priority status and Dr. Reeves is out of office.    Please review. This medication has no protocol attached.  Medication request is marked high priority: patient is stating she is out and would like refills added, so she does not have to keep asking every month.    No future appointments.  Last office visit: 10/9/2024    Requested Prescriptions   Pending Prescriptions Disp Refills    ZEPBOUND 10 MG/0.5ML Subcutaneous Solution Auto-injector [Pharmacy Med Name: Zepbound Subcutaneous Solution Auto-injector 10 MG/0.5ML] 2 mL 0     Sig: INJECT 1 PEN UNDER THE SKIN ONCE EVERY 7 DAYS       There is no refill protocol information for this order            Recent Outpatient Visits              4 weeks ago Encounter for routine adult health examination without abnormal findings    [facility] Lisa Reeves DO    Office Visit    1 month ago Cervical radiculopathy    [facility] Inez Corey DO    Office Visit    4 months ago Right lateral epicondylitis    [facility] Lisa Reeves DO    Office Visit    5 months ago Abdominal pain, unspecified abdominal location    [facility] Inez Corey DO    Office Visit    12 months ago TMJ (temporomandibular joint disorder)    [facility]    [facility] Visit

## 2024-11-07 NOTE — TELEPHONE ENCOUNTER
The patient calling in stating the pharmacy has not received her Zepbound.   She would also like several refills so she does not have to keep calling.    The patient is asking it to be refilled today.

## 2025-01-06 DIAGNOSIS — F90.0 ATTENTION DEFICIT HYPERACTIVITY DISORDER (ADHD), PREDOMINANTLY INATTENTIVE TYPE: ICD-10-CM

## 2025-01-09 RX ORDER — LISDEXAMFETAMINE DIMESYLATE 30 MG/1
30 CAPSULE ORAL EVERY MORNING
Qty: 30 CAPSULE | Refills: 0 | Status: SHIPPED | OUTPATIENT
Start: 2025-01-09

## 2025-01-10 NOTE — TELEPHONE ENCOUNTER
Please review. Protocol Failed; No Protocol      Recent fills: 10/9/2024  Last Rx written: 10/9/2024  Last office visit: 10/9/2024          Requested Prescriptions   Pending Prescriptions Disp Refills    lisdexamfetamine (VYVANSE) 30 MG Oral Cap 30 capsule 0     Sig: Take 1 capsule (30 mg total) by mouth every morning.       Controlled Substance Medication Failed - 1/9/2025  6:43 PM        Failed - This medication is a controlled substance - forward to provider to refill        Passed - Medication is active on med list                 Recent Outpatient Visits              3 months ago Encounter for routine adult health examination without abnormal findings    [facility] Lisa Reeves DO    Office Visit    3 months ago Cervical radiculopathy    [facility] Inez Corey DO    Office Visit    6 months ago Right lateral epicondylitis    [facility] Lisa Reeves DO    Office Visit    7 months ago Abdominal pain, unspecified abdominal location    [facility] Inez Corey DO    Office Visit    1 year ago TMJ (temporomandibular joint disorder)    [facility]    Office Visit

## 2025-03-11 ENCOUNTER — TELEPHONE (OUTPATIENT)
Facility: CLINIC | Age: 50
End: 2025-03-11

## 2025-03-11 NOTE — TELEPHONE ENCOUNTER
Called patient and left voicemail informing her that ZEPBOUND is denied, and that her options are:    - call her insurance pharmacy benefit plan and inquire about what is covered for weight-loss.    - Dr. Reeves can try to send in another medication and hope that it is covered after a prior authorization.

## 2025-03-11 NOTE — TELEPHONE ENCOUNTER
Dr. Reeves,    Medication is not covered by plan. Would you like the patient to inquire about what is covered with her insurance? How would you like to proceed?    Thank you,  SATISH Ross DENIED        Prior authorization completed via CoverMyMeds for ZEPBOUND.    Key: [id number]

## 2025-03-21 DIAGNOSIS — F90.0 ATTENTION DEFICIT HYPERACTIVITY DISORDER (ADHD), PREDOMINANTLY INATTENTIVE TYPE: ICD-10-CM

## 2025-03-25 DIAGNOSIS — F90.0 ATTENTION DEFICIT HYPERACTIVITY DISORDER (ADHD), PREDOMINANTLY INATTENTIVE TYPE: ICD-10-CM

## 2025-03-25 RX ORDER — LISDEXAMFETAMINE DIMESYLATE 30 MG/1
30 CAPSULE ORAL EVERY MORNING
Qty: 30 CAPSULE | Refills: 0 | Status: SHIPPED | OUTPATIENT
Start: 2025-03-25

## 2025-03-25 NOTE — TELEPHONE ENCOUNTER
Please review; protocol failed/ has no protocol      Recent fills: 01/10/2025  Last Rx written: 01/09/2025  Last Office Visit: 10/09/2024    Recent Visits  Date Type Provider Dept   10/09/24 Office Visit Lisa Reeves DO [facility]

## 2025-03-28 RX ORDER — LISDEXAMFETAMINE DIMESYLATE 30 MG/1
30 CAPSULE ORAL EVERY MORNING
Qty: 30 CAPSULE | Refills: 0 | OUTPATIENT
Start: 2025-03-28

## 2025-03-28 NOTE — TELEPHONE ENCOUNTER
Disp Refills Start End    lisdexamfetamine (VYVANSE) 30 MG Oral Cap 30 capsule 0 3/25/2025 --    Sig - Route: Take 1 capsule (30 mg total) by mouth every morning. - Oral    Sent to pharmacy as: Lisdexamfetamine Dimesylate 30 MG Oral Capsule (Vyvanse)    Earliest Fill Date: 3/25/2025    E-Prescribing Status: Receipt confirmed by pharmacy (3/25/2025 10:40 PM CDT)      Associated Diagnoses    Attention deficit hyperactivity disorder (ADHD), predominantly inattentive type        Pharmacy    [id number] - [place], [place] - [address] [phone], [phone]

## 2025-05-08 DIAGNOSIS — F90.0 ATTENTION DEFICIT HYPERACTIVITY DISORDER (ADHD), PREDOMINANTLY INATTENTIVE TYPE: ICD-10-CM

## 2025-05-08 RX ORDER — LISDEXAMFETAMINE DIMESYLATE 30 MG/1
30 CAPSULE ORAL EVERY MORNING
Qty: 30 CAPSULE | Refills: 0 | Status: SHIPPED | OUTPATIENT
Start: 2025-05-08

## 2025-05-08 NOTE — TELEPHONE ENCOUNTER
Please review. Refill failed protocol.     Recent fills each # 30 : 3/26/25  Last prescription written: 3/25/25  Last office visit:  Visit date not found    No future appointments.

## 2025-07-09 DIAGNOSIS — F90.0 ATTENTION DEFICIT HYPERACTIVITY DISORDER (ADHD), PREDOMINANTLY INATTENTIVE TYPE: ICD-10-CM

## 2025-07-11 RX ORDER — LISDEXAMFETAMINE DIMESYLATE 30 MG/1
30 CAPSULE ORAL EVERY MORNING
Qty: 30 CAPSULE | Refills: 0 | Status: SHIPPED | OUTPATIENT
Start: 2025-07-11

## 2025-07-11 NOTE — TELEPHONE ENCOUNTER
Please review; protocol failed/No Protocol      Recent Fills: 03/26/2025, 05/08/2025 #30 for 30 day supply     Last Rx Written: 05/08/2025    Last Office Visit: 10/09/2024  Future Appointments   Date Time Provider Department Center   10/6/2025 11:30 AM Lisa Reeves DO [facility]

## (undated) DEVICE — STANDARD HYPODERMIC NEEDLE,POLYPROPYLENE HUB: Brand: MONOJECT

## (undated) DEVICE — GAMMEX® PI HYBRID SIZE 6.5, STERILE POWDER-FREE SURGICAL GLOVE, POLYISOPRENE AND NEOPRENE BLEND: Brand: GAMMEX

## (undated) DEVICE — APPLICATOR SKIN PREP 10.5ML HI LT ORNG 2% CHG

## (undated) DEVICE — 6 ML SYRINGE LUER-LOCK TIP: Brand: MONOJECT

## (undated) DEVICE — ISOVUE-M200 IOPAMIDOL 41% 10ML

## (undated) DEVICE — SYRINGE MED 10ML LL TIP W/O SFTY DISP

## (undated) DEVICE — SPONGE GZ 4X4IN COT 12 PLY TYP VII WVN C

## (undated) DEVICE — 12 ML SYRINGE LUER-LOCK TIP: Brand: MONOJECT

## (undated) DEVICE — NEEDLE ANES 22GA L3.5IN SPNL SS QNCKE STYL

## (undated) DEVICE — APPLICATOR PREP 10.5ML ORNG CHG 2% ISO ALC

## (undated) DEVICE — OR TOWEL, 17" X 26" STERILE, BLUE: Brand: PREMIERPRO

## (undated) DEVICE — SPONGE GZ 4XL4IN 100% COT 12 PLY TYP VII WVN

## (undated) DEVICE — Device

## (undated) DEVICE — SPONGE 4X4 10PK

## (undated) DEVICE — NEEDLE BLNT 18GA L1.5IN FILL DISP PRECISGLDE

## (undated) DEVICE — TOWEL SURG OR 17X30IN BLUE

## (undated) DEVICE — APPLICATOR CHLORAPREP 10.5ML

## (undated) DEVICE — TOWEL,OR,DSP,ST,BLUE,DLX,2/PK,40PK/CS: Brand: MEDLINE

## (undated) DEVICE — NEEDLE SPNL 22GA L5IN BLK HUB QNCKE BVL DISP

## (undated) DEVICE — GAUZE TRAY STERILE 4X4 12PLY

## (undated) DEVICE — GAMMEX® PI HYBRID SIZE 8, STERILE POWDER-FREE SURGICAL GLOVE, POLYISOPRENE AND NEOPRENE BLEND: Brand: GAMMEX

## (undated) DEVICE — EXTENSION SET MICROBORE

## (undated) DEVICE — SET EXTENSION IV MINIBORE

## (undated) DEVICE — SET EXTN 2.7ML TBNG L20IN DIA0.100IN MACBOR

## (undated) DEVICE — NEEDLE HYPO 18GA L1.5IN PNK HUB PVT SHLD BVL

## (undated) DEVICE — NEEDLE HPO 18GA 1.5IN ECLPS

## (undated) DEVICE — BANDAGE ADH 1.5X3IN FAB KNCK CURAD

## (undated) DEVICE — NEEDLE HYPO 18GA L1.5IN PNK SS PVT SHLD BVL

## (undated) NOTE — Clinical Note
Thanks for the referral.     I am going to start her on Zoloft. She is self medicating her stress with alcohol. She had a food sensitivity test done and still eats all the foods that she is sensitive to. She needs to first stop the sensitive foods like eggs and sesame.      Thanks    [facility]

## (undated) NOTE — LETTER
Date: 2/23/2023    Patient Name: J Carlos Stone          To Whom it may concern: The above patient was seen at the [facility] for treatment of a medical condition. This patient should be allowed to carry her Mounjaro medication on the plane while on ice as it requires refrigeration and is medically necessary.          Sincerely,    Wright Aschoff, DO